# Patient Record
Sex: MALE | HISPANIC OR LATINO | Employment: UNEMPLOYED | URBAN - METROPOLITAN AREA
[De-identification: names, ages, dates, MRNs, and addresses within clinical notes are randomized per-mention and may not be internally consistent; named-entity substitution may affect disease eponyms.]

---

## 2017-01-04 ENCOUNTER — APPOINTMENT (OUTPATIENT)
Dept: RADIOLOGY | Facility: MEDICAL CENTER | Age: 67
DRG: 418 | End: 2017-01-04
Attending: EMERGENCY MEDICINE
Payer: MEDICAID

## 2017-01-04 ENCOUNTER — HOSPITAL ENCOUNTER (INPATIENT)
Facility: MEDICAL CENTER | Age: 67
LOS: 4 days | DRG: 418 | End: 2017-01-08
Attending: EMERGENCY MEDICINE | Admitting: INTERNAL MEDICINE
Payer: MEDICAID

## 2017-01-04 ENCOUNTER — RESOLUTE PROFESSIONAL BILLING HOSPITAL PROF FEE (OUTPATIENT)
Dept: HOSPITALIST | Facility: MEDICAL CENTER | Age: 67
End: 2017-01-04
Payer: MEDICAID

## 2017-01-04 DIAGNOSIS — K81.9 CHOLECYSTITIS: ICD-10-CM

## 2017-01-04 PROBLEM — R10.9 ABDOMINAL PAIN: Status: ACTIVE | Noted: 2017-01-04

## 2017-01-04 PROBLEM — E11.9 DIABETES (HCC): Status: ACTIVE | Noted: 2017-01-04

## 2017-01-04 LAB
ALBUMIN SERPL BCP-MCNC: 3.9 G/DL (ref 3.2–4.9)
ALBUMIN/GLOB SERPL: 1.6 G/DL
ALP SERPL-CCNC: 48 U/L (ref 30–99)
ALT SERPL-CCNC: 17 U/L (ref 2–50)
ANION GAP SERPL CALC-SCNC: 10 MMOL/L (ref 0–11.9)
APPEARANCE UR: CLEAR
AST SERPL-CCNC: 20 U/L (ref 12–45)
BASOPHILS # BLD AUTO: 0 % (ref 0–1.8)
BASOPHILS # BLD: 0 K/UL (ref 0–0.12)
BILIRUB SERPL-MCNC: 0.9 MG/DL (ref 0.1–1.5)
BILIRUB UR QL STRIP.AUTO: NEGATIVE
BUN SERPL-MCNC: 15 MG/DL (ref 8–22)
CALCIUM SERPL-MCNC: 9.7 MG/DL (ref 8.5–10.5)
CHLORIDE SERPL-SCNC: 101 MMOL/L (ref 96–112)
CO2 SERPL-SCNC: 26 MMOL/L (ref 20–33)
COLOR UR: YELLOW
CREAT SERPL-MCNC: 0.81 MG/DL (ref 0.5–1.4)
CULTURE IF INDICATED INDCX: NO UA CULTURE
EKG IMPRESSION: NORMAL
EOSINOPHIL # BLD AUTO: 0 K/UL (ref 0–0.51)
EOSINOPHIL NFR BLD: 0 % (ref 0–6.9)
ERYTHROCYTE [DISTWIDTH] IN BLOOD BY AUTOMATED COUNT: 40.9 FL (ref 35.9–50)
GFR SERPL CREATININE-BSD FRML MDRD: >60 ML/MIN/1.73 M 2
GLOBULIN SER CALC-MCNC: 2.5 G/DL (ref 1.9–3.5)
GLUCOSE BLD-MCNC: 116 MG/DL (ref 65–99)
GLUCOSE BLD-MCNC: 181 MG/DL (ref 65–99)
GLUCOSE SERPL-MCNC: 158 MG/DL (ref 65–99)
GLUCOSE UR STRIP.AUTO-MCNC: ABNORMAL MG/DL
HCT VFR BLD AUTO: 44.6 % (ref 42–52)
HGB BLD-MCNC: 15.6 G/DL (ref 14–18)
KETONES UR STRIP.AUTO-MCNC: ABNORMAL MG/DL
LACTATE BLD-SCNC: 3 MMOL/L (ref 0.5–2)
LEUKOCYTE ESTERASE UR QL STRIP.AUTO: NEGATIVE
LIPASE SERPL-CCNC: 28 U/L (ref 11–82)
LYMPHOCYTES # BLD AUTO: 0.51 K/UL (ref 1–4.8)
LYMPHOCYTES NFR BLD: 4.4 % (ref 22–41)
MANUAL DIFF BLD: ABNORMAL
MCH RBC QN AUTO: 30.1 PG (ref 27–33)
MCHC RBC AUTO-ENTMCNC: 35 G/DL (ref 33.7–35.3)
MCV RBC AUTO: 86.1 FL (ref 81.4–97.8)
METAMYELOCYTES NFR BLD MANUAL: 4.3 %
MICRO URNS: ABNORMAL
MONOCYTES # BLD AUTO: 0.3 K/UL (ref 0–0.85)
MONOCYTES NFR BLD AUTO: 2.6 % (ref 0–13.4)
MORPHOLOGY BLD-IMP: NORMAL
NEUTROPHILS # BLD AUTO: 10.29 K/UL (ref 1.82–7.42)
NEUTROPHILS NFR BLD: 76.5 % (ref 44–72)
NEUTS BAND NFR BLD MANUAL: 12.2 % (ref 0–10)
NITRITE UR QL STRIP.AUTO: NEGATIVE
NRBC # BLD AUTO: 0 K/UL
NRBC BLD AUTO-RTO: 0 /100 WBC
PH UR STRIP.AUTO: 6 [PH]
PLATELET # BLD AUTO: 98 K/UL (ref 164–446)
PLATELET BLD QL SMEAR: NORMAL
PMV BLD AUTO: 8.7 FL (ref 9–12.9)
POTASSIUM SERPL-SCNC: 3.5 MMOL/L (ref 3.6–5.5)
PROT SERPL-MCNC: 6.4 G/DL (ref 6–8.2)
PROT UR QL STRIP: NEGATIVE MG/DL
RBC # BLD AUTO: 5.18 M/UL (ref 4.7–6.1)
RBC BLD AUTO: NORMAL
RBC UR QL AUTO: NEGATIVE
SODIUM SERPL-SCNC: 137 MMOL/L (ref 135–145)
SP GR UR STRIP.AUTO: 1.02
TSH SERPL DL<=0.005 MIU/L-ACNC: 0.83 UIU/ML (ref 0.3–3.7)
WBC # BLD AUTO: 11.6 K/UL (ref 4.8–10.8)

## 2017-01-04 PROCEDURE — 700117 HCHG RX CONTRAST REV CODE 255: Performed by: EMERGENCY MEDICINE

## 2017-01-04 PROCEDURE — 93005 ELECTROCARDIOGRAM TRACING: CPT | Performed by: EMERGENCY MEDICINE

## 2017-01-04 PROCEDURE — 700101 HCHG RX REV CODE 250: Performed by: INTERNAL MEDICINE

## 2017-01-04 PROCEDURE — 83605 ASSAY OF LACTIC ACID: CPT

## 2017-01-04 PROCEDURE — 83690 ASSAY OF LIPASE: CPT

## 2017-01-04 PROCEDURE — 770006 HCHG ROOM/CARE - MED/SURG/GYN SEMI*

## 2017-01-04 PROCEDURE — 700105 HCHG RX REV CODE 258: Performed by: EMERGENCY MEDICINE

## 2017-01-04 PROCEDURE — 96361 HYDRATE IV INFUSION ADD-ON: CPT

## 2017-01-04 PROCEDURE — 99223 1ST HOSP IP/OBS HIGH 75: CPT | Performed by: INTERNAL MEDICINE

## 2017-01-04 PROCEDURE — A9270 NON-COVERED ITEM OR SERVICE: HCPCS | Performed by: INTERNAL MEDICINE

## 2017-01-04 PROCEDURE — 96365 THER/PROPH/DIAG IV INF INIT: CPT

## 2017-01-04 PROCEDURE — 71010 DX-CHEST-PORTABLE (1 VIEW): CPT

## 2017-01-04 PROCEDURE — 74177 CT ABD & PELVIS W/CONTRAST: CPT

## 2017-01-04 PROCEDURE — 83036 HEMOGLOBIN GLYCOSYLATED A1C: CPT

## 2017-01-04 PROCEDURE — 85007 BL SMEAR W/DIFF WBC COUNT: CPT

## 2017-01-04 PROCEDURE — 96375 TX/PRO/DX INJ NEW DRUG ADDON: CPT

## 2017-01-04 PROCEDURE — 81003 URINALYSIS AUTO W/O SCOPE: CPT

## 2017-01-04 PROCEDURE — 80053 COMPREHEN METABOLIC PANEL: CPT

## 2017-01-04 PROCEDURE — 84443 ASSAY THYROID STIM HORMONE: CPT

## 2017-01-04 PROCEDURE — 700112 HCHG RX REV CODE 229: Performed by: INTERNAL MEDICINE

## 2017-01-04 PROCEDURE — 99285 EMERGENCY DEPT VISIT HI MDM: CPT

## 2017-01-04 PROCEDURE — 85027 COMPLETE CBC AUTOMATED: CPT

## 2017-01-04 PROCEDURE — 700102 HCHG RX REV CODE 250 W/ 637 OVERRIDE(OP): Performed by: INTERNAL MEDICINE

## 2017-01-04 PROCEDURE — 700111 HCHG RX REV CODE 636 W/ 250 OVERRIDE (IP): Performed by: EMERGENCY MEDICINE

## 2017-01-04 PROCEDURE — 82962 GLUCOSE BLOOD TEST: CPT

## 2017-01-04 PROCEDURE — 94760 N-INVAS EAR/PLS OXIMETRY 1: CPT

## 2017-01-04 PROCEDURE — 700111 HCHG RX REV CODE 636 W/ 250 OVERRIDE (IP): Performed by: INTERNAL MEDICINE

## 2017-01-04 RX ORDER — PROMETHAZINE HYDROCHLORIDE 12.5 MG/1
12.5-25 SUPPOSITORY RECTAL EVERY 4 HOURS PRN
Status: DISCONTINUED | OUTPATIENT
Start: 2017-01-04 | End: 2017-01-08 | Stop reason: HOSPADM

## 2017-01-04 RX ORDER — SODIUM CHLORIDE 9 MG/ML
1000 INJECTION, SOLUTION INTRAVENOUS ONCE
Status: COMPLETED | OUTPATIENT
Start: 2017-01-04 | End: 2017-01-04

## 2017-01-04 RX ORDER — SODIUM CHLORIDE AND POTASSIUM CHLORIDE 150; 900 MG/100ML; MG/100ML
INJECTION, SOLUTION INTRAVENOUS CONTINUOUS
Status: DISCONTINUED | OUTPATIENT
Start: 2017-01-04 | End: 2017-01-06

## 2017-01-04 RX ORDER — LACTULOSE 20 G/30ML
30 SOLUTION ORAL
Status: DISCONTINUED | OUTPATIENT
Start: 2017-01-04 | End: 2017-01-08 | Stop reason: HOSPADM

## 2017-01-04 RX ORDER — MORPHINE SULFATE 4 MG/ML
4 INJECTION, SOLUTION INTRAMUSCULAR; INTRAVENOUS
Status: DISCONTINUED | OUTPATIENT
Start: 2017-01-04 | End: 2017-01-04

## 2017-01-04 RX ORDER — HYDROCODONE BITARTRATE AND ACETAMINOPHEN 10; 325 MG/1; MG/1
1 TABLET ORAL EVERY 8 HOURS PRN
Status: DISCONTINUED | OUTPATIENT
Start: 2017-01-04 | End: 2017-01-08 | Stop reason: HOSPADM

## 2017-01-04 RX ORDER — DEXTROSE MONOHYDRATE 25 G/50ML
25 INJECTION, SOLUTION INTRAVENOUS
Status: DISCONTINUED | OUTPATIENT
Start: 2017-01-04 | End: 2017-01-08 | Stop reason: HOSPADM

## 2017-01-04 RX ORDER — ENEMA 19; 7 G/133ML; G/133ML
1 ENEMA RECTAL
Status: DISCONTINUED | OUTPATIENT
Start: 2017-01-04 | End: 2017-01-08 | Stop reason: HOSPADM

## 2017-01-04 RX ORDER — INSULIN GLARGINE 100 [IU]/ML
15 INJECTION, SOLUTION SUBCUTANEOUS EVERY EVENING
Status: DISCONTINUED | OUTPATIENT
Start: 2017-01-04 | End: 2017-01-06

## 2017-01-04 RX ORDER — ONDANSETRON 2 MG/ML
4 INJECTION INTRAMUSCULAR; INTRAVENOUS ONCE
Status: COMPLETED | OUTPATIENT
Start: 2017-01-04 | End: 2017-01-04

## 2017-01-04 RX ORDER — AMOXICILLIN 250 MG
1 CAPSULE ORAL NIGHTLY
Status: DISCONTINUED | OUTPATIENT
Start: 2017-01-04 | End: 2017-01-08 | Stop reason: HOSPADM

## 2017-01-04 RX ORDER — LISINOPRIL 10 MG/1
5 TABLET ORAL DAILY
Status: DISCONTINUED | OUTPATIENT
Start: 2017-01-04 | End: 2017-01-05

## 2017-01-04 RX ORDER — ROSUVASTATIN CALCIUM 20 MG/1
40 TABLET, COATED ORAL NIGHTLY
Status: DISCONTINUED | OUTPATIENT
Start: 2017-01-04 | End: 2017-01-08 | Stop reason: HOSPADM

## 2017-01-04 RX ORDER — CIPROFLOXACIN 2 MG/ML
400 INJECTION, SOLUTION INTRAVENOUS EVERY 12 HOURS
Status: DISCONTINUED | OUTPATIENT
Start: 2017-01-04 | End: 2017-01-08 | Stop reason: HOSPADM

## 2017-01-04 RX ORDER — ONDANSETRON 4 MG/1
4 TABLET, ORALLY DISINTEGRATING ORAL EVERY 4 HOURS PRN
Status: DISCONTINUED | OUTPATIENT
Start: 2017-01-04 | End: 2017-01-08 | Stop reason: HOSPADM

## 2017-01-04 RX ORDER — AMOXICILLIN 250 MG
1 CAPSULE ORAL
Status: DISCONTINUED | OUTPATIENT
Start: 2017-01-04 | End: 2017-01-08 | Stop reason: HOSPADM

## 2017-01-04 RX ORDER — ONDANSETRON 2 MG/ML
4 INJECTION INTRAMUSCULAR; INTRAVENOUS EVERY 4 HOURS PRN
Status: DISCONTINUED | OUTPATIENT
Start: 2017-01-04 | End: 2017-01-08 | Stop reason: HOSPADM

## 2017-01-04 RX ORDER — PROMETHAZINE HYDROCHLORIDE 25 MG/1
12.5-25 TABLET ORAL EVERY 4 HOURS PRN
Status: DISCONTINUED | OUTPATIENT
Start: 2017-01-04 | End: 2017-01-08 | Stop reason: HOSPADM

## 2017-01-04 RX ORDER — ACETAMINOPHEN 325 MG/1
650 TABLET ORAL EVERY 6 HOURS PRN
Status: DISCONTINUED | OUTPATIENT
Start: 2017-01-04 | End: 2017-01-05

## 2017-01-04 RX ORDER — DOCUSATE SODIUM 100 MG/1
100 CAPSULE, LIQUID FILLED ORAL EVERY MORNING
Status: DISCONTINUED | OUTPATIENT
Start: 2017-01-04 | End: 2017-01-08 | Stop reason: HOSPADM

## 2017-01-04 RX ORDER — FAMOTIDINE 20 MG/1
20 TABLET, FILM COATED ORAL 2 TIMES DAILY
Status: DISCONTINUED | OUTPATIENT
Start: 2017-01-04 | End: 2017-01-08 | Stop reason: HOSPADM

## 2017-01-04 RX ORDER — LABETALOL HYDROCHLORIDE 5 MG/ML
10 INJECTION, SOLUTION INTRAVENOUS EVERY 4 HOURS PRN
Status: DISCONTINUED | OUTPATIENT
Start: 2017-01-04 | End: 2017-01-08 | Stop reason: HOSPADM

## 2017-01-04 RX ORDER — BISACODYL 10 MG
10 SUPPOSITORY, RECTAL RECTAL
Status: DISCONTINUED | OUTPATIENT
Start: 2017-01-04 | End: 2017-01-08 | Stop reason: HOSPADM

## 2017-01-04 RX ORDER — RANITIDINE 300 MG/1
300 TABLET ORAL DAILY
Status: DISCONTINUED | OUTPATIENT
Start: 2017-01-04 | End: 2017-01-04

## 2017-01-04 RX ADMIN — IOHEXOL 100 ML: 350 INJECTION, SOLUTION INTRAVENOUS at 06:15

## 2017-01-04 RX ADMIN — LISINOPRIL 5 MG: 10 TABLET ORAL at 11:27

## 2017-01-04 RX ADMIN — PIPERACILLIN AND TAZOBACTAM 4.5 G: 4; .5 INJECTION, POWDER, LYOPHILIZED, FOR SOLUTION INTRAVENOUS; PARENTERAL at 07:12

## 2017-01-04 RX ADMIN — ACETAMINOPHEN 650 MG: 325 TABLET, FILM COATED ORAL at 19:00

## 2017-01-04 RX ADMIN — FAMOTIDINE 20 MG: 20 TABLET, FILM COATED ORAL at 21:07

## 2017-01-04 RX ADMIN — HYDROCODONE BITARTRATE AND ACETAMINOPHEN 0.5 TABLET: 10; 325 TABLET ORAL at 11:29

## 2017-01-04 RX ADMIN — INSULIN LISPRO 3 UNITS: 100 INJECTION, SOLUTION INTRAVENOUS; SUBCUTANEOUS at 16:43

## 2017-01-04 RX ADMIN — CIPROFLOXACIN 400 MG: 2 INJECTION, SOLUTION INTRAVENOUS at 11:19

## 2017-01-04 RX ADMIN — ONDANSETRON 4 MG: 2 INJECTION, SOLUTION INTRAMUSCULAR; INTRAVENOUS at 04:35

## 2017-01-04 RX ADMIN — DOCUSATE SODIUM 100 MG: 100 CAPSULE ORAL at 11:30

## 2017-01-04 RX ADMIN — CIPROFLOXACIN 400 MG: 2 INJECTION, SOLUTION INTRAVENOUS at 21:00

## 2017-01-04 RX ADMIN — METRONIDAZOLE 500 MG: 500 INJECTION, SOLUTION INTRAVENOUS at 13:05

## 2017-01-04 RX ADMIN — METRONIDAZOLE 500 MG: 500 INJECTION, SOLUTION INTRAVENOUS at 21:05

## 2017-01-04 RX ADMIN — POTASSIUM CHLORIDE AND SODIUM CHLORIDE: 900; 150 INJECTION, SOLUTION INTRAVENOUS at 11:23

## 2017-01-04 RX ADMIN — MORPHINE SULFATE 4 MG: 4 INJECTION INTRAVENOUS at 07:53

## 2017-01-04 RX ADMIN — SODIUM CHLORIDE 1000 ML: 9 INJECTION, SOLUTION INTRAVENOUS at 06:22

## 2017-01-04 RX ADMIN — Medication 1 TABLET: at 21:07

## 2017-01-04 RX ADMIN — ROSUVASTATIN CALCIUM 40 MG: 20 TABLET ORAL at 21:07

## 2017-01-04 RX ADMIN — SODIUM CHLORIDE 1000 ML: 9 INJECTION, SOLUTION INTRAVENOUS at 04:08

## 2017-01-04 RX ADMIN — ENOXAPARIN SODIUM 40 MG: 100 INJECTION SUBCUTANEOUS at 13:05

## 2017-01-04 RX ADMIN — FAMOTIDINE 20 MG: 10 INJECTION, SOLUTION INTRAVENOUS at 11:11

## 2017-01-04 ASSESSMENT — PAIN SCALES - GENERAL
PAINLEVEL_OUTOF10: ASSUMED PAIN PRESENT
PAINLEVEL_OUTOF10: 5
PAINLEVEL_OUTOF10: 4
PAINLEVEL_OUTOF10: ASSUMED PAIN PRESENT

## 2017-01-04 ASSESSMENT — LIFESTYLE VARIABLES
EVER_SMOKED: NEVER
EVER_SMOKED: NEVER
ALCOHOL_USE: NO

## 2017-01-04 ASSESSMENT — COPD QUESTIONNAIRES
COPD SCREENING SCORE: 2
HAVE YOU SMOKED AT LEAST 100 CIGARETTES IN YOUR ENTIRE LIFE: NO/DON'T KNOW
DURING THE PAST 4 WEEKS HOW MUCH DID YOU FEEL SHORT OF BREATH: NONE/LITTLE OF THE TIME
DO YOU EVER COUGH UP ANY MUCUS OR PHLEGM?: NO/ONLY WITH OCCASIONAL COLDS OR INFECTIONS

## 2017-01-04 NOTE — IP AVS SNAPSHOT
" After Visit Summary                                                                                                                  Name:Yonis De La Cruz  Medical Record Number:[id number]  CSN: [id number]    YOB: 1950   Age: 66 y.o.  Sex: male  HT:1.702 m (5' 7.01\") WT: 72 kg (158 lb 11.7 oz)          Admit Date: 1/4/2017     Discharge Date:   Today's Date: 1/8/2017  Attending Doctor:  RENE Souza*                  Allergies:  Aspirin and Bloodless            Discharge Instructions       Do not lift or pull anything weighing greater than 15 pounds for the next four weeks.  You may remove the bandages when you get home.  You may shower, but may not soak in tubs, hot tubs, or swim.  You may take an over-the-counter laxative for constipation as needed.  Call the surgeon's office for any signs of infection such as drainage for the incisions, fevers, chills, or redness. Notify office if any incisions open up and for pain not controlled with medication. For any emergency, attend your local ED.  Take medications as prescribed.    Discharge Instructions    Discharged to home by car with relative. Discharged via wheelchair, hospital escort: Yes.  Special equipment needed: Not Applicable    Be sure to schedule a follow-up appointment with your primary care doctor or any specialists as instructed.     Discharge Plan:   Diet Plan: Discussed  Activity Level: Discussed  Confirmed Follow up Appointment: Patient to Call and Schedule Appointment  Confirmed Symptoms Management: Discussed  Medication Reconciliation Updated: Yes  Influenza Vaccine Indication: Not indicated: Previously immunized this influenza season and > 8 years of age  Influenza Vaccine Given - only chart on this line when given: Influenza Vaccine Given (See MAR)    I understand that a diet low in cholesterol, fat, and sodium is recommended for good health. Unless I have been given specific instructions below for another diet, I accept this " instruction as my diet prescription.   Other diet: Diabetic, low fiber.    Special Instructions: None    · Is patient discharged on Warfarin / Coumadin?   No     · Is patient Post Blood Transfusion?  No    Depression / Suicide Risk    As you are discharged from this [facility] facility, it is important to learn how to keep safe from harming yourself.    Recognize the warning signs:  · Abrupt changes in personality, positive or negative- including increase in energy   · Giving away possessions  · Change in eating patterns- significant weight changes-  positive or negative  · Change in sleeping patterns- unable to sleep or sleeping all the time   · Unwillingness or inability to communicate  · Depression  · Unusual sadness, discouragement and loneliness  · Talk of wanting to die  · Neglect of personal appearance   · Rebelliousness- reckless behavior  · Withdrawal from people/activities they love  · Confusion- inability to concentrate     If you or a loved one observes any of these behaviors or has concerns about self-harm, here's what you can do:  · Talk about it- your feelings and reasons for harming yourself  · Remove any means that you might use to hurt yourself (examples: pills, rope, extension cords, firearm)  · Get professional help from the community (Mental Health, Substance Abuse, psychological counseling)  · Do not be alone:Call your Safe Contact- someone whom you trust who will be there for you.  · Call your local CRISIS HOTLINE 234-8088 or 280-387-9203  · Call your local Children's Mobile Crisis Response Team [place] (885) 882-6099 or www.Amitive  · Call the toll free National Suicide Prevention Hotlines   · National Suicide Prevention Lifeline 530-021-LDKC (1304)  · National Hope Line Network 800-SUICIDE (314-3850)        Follow-up Information     1. Follow up with Dustin Ferreira M.D.. Schedule an appointment as soon as possible for a visit in 10 days.    Specialties:  Surgery,  Radiology    Contact information    [address]  [address]  [place] [id number] [phone]           Discharge Medication Instructions:    Below are the medications your physician expects you to take upon discharge:    Review all your home medications and newly ordered medications with your doctor and/or pharmacist. Follow medication instructions as directed by your doctor and/or pharmacist.    Please keep your medication list with you and share with your physician.               Medication List      START taking these medications        Instructions    acetaminophen 325 MG Tabs   Last time this was given:  650 mg on 1/8/2017  8:28 AM   Commonly known as:  TYLENOL   Next Dose Due:  Anytime as needed.    Take 2 Tabs by mouth every 6 hours as needed for Mild Pain.   Dose:  650 mg       ciprofloxacin 500 MG Tabs   Commonly known as:  CIPRO   Next Dose Due:  1/8 PM    Take 1 Tab by mouth 2 times a day.   Dose:  500 mg       magnesium hydroxide 400 MG/5ML Susp   Last time this was given:  30 mL on 1/7/2017  7:59 AM   Commonly known as:  MILK OF MAGNESIA   Next Dose Due:  As needed    Take 30 mL by mouth 4 times a day as needed (constipation).   Dose:  30 mL       metronidazole 500 MG Tabs   Commonly known as:  FLAGYL   Next Dose Due:  1/8 Afternoon    Take 1 Tab by mouth every 8 hours.   Dose:  500 mg       polyethylene glycol/lytes Pack   Last time this was given:  1 Packet on 1/8/2017  8:28 AM   Commonly known as:  MIRALAX   Next Dose Due:  1/9    Take  by mouth every day.         CHANGE how you take these medications        Instructions    hydrocodone/acetaminophen  MG Tabs   What changed:  reasons to take this   Last time this was given:  1 Tab on 1/7/2017 11:56 AM   Commonly known as:  NORCO   Next Dose Due:  Anytime as needed    Take 1 Tab by mouth every 8 hours as needed.   Dose:  1 Tab         CONTINUE taking these medications        Instructions    CALCIUM PO   Next Dose Due:  1/8 PM    Take 1 Tab by  mouth 3 times a day.   Dose:  1 Tab       CRESTOR 40 MG tablet   Last time this was given:  40 mg on 1/7/2017  9:10 PM   Generic drug:  rosuvastatin   Next Dose Due:  1/8 PM    TAKE ONE TABLET BY MOUTH ONCE DAILY       LANTUS 100 UNIT/ML Soln   Last time this was given:  18 Units on 1/7/2017  9:14 PM   Generic drug:  insulin glargine   Next Dose Due:  1/8 PM    INJECT 20 UNITS SUBCUTANEOUSLY AS INSTRUCTED TWICE DAILY       lisinopril 5 MG Tabs   Last time this was given:  5 mg on 1/4/2017 11:27 AM   Commonly known as:  PRINIVIL   Next Dose Due:  1/9 PM    Take 1 Tab by mouth every day.   Dose:  5 mg       metformin 1000 MG tablet   Commonly known as:  GLUCOPHAGE   Next Dose Due:  1/8 with dinner    TAKE ONE TABLET BY MOUTH TWICE DAILY WITH FOOD       ranitidine 300 MG tablet   Commonly known as:  ZANTAC   Next Dose Due:  1/9    Take 1 Tab by mouth every day.   Dose:  300 mg               Instructions           Diet / Nutrition:    Follow any diet instructions given to you by your doctor or the dietician, including how much salt (sodium) you are allowed each day.    If you are overweight, talk to your doctor about a weight reduction plan.    Activity:    Remain physically active following your doctor's instructions about exercise and activity.    Rest often.     Any time you become even a little tired or short of breath, SIT DOWN and rest.    Worsening Symptoms:    Report any of the following signs and symptoms to the doctor's office immediately:    *Pain of jaw, arm, or neck  *Chest pain not relieved by medication                               *Dizziness or loss of consciousness  *Difficulty breathing even when at rest   *More tired than usual                                       *Bleeding drainage or swelling of surgical site  *Swelling of feet, ankles, legs or stomach                 *Fever (>100ºF)  *Pink or blood tinged sputum  *Weight gain (3lbs/day or 5lbs /week)           *Shock from internal defibrillator  (if applicable)  *Palpitations or irregular heartbeats                *Cool and/or numb extremities    Stroke Awareness    Common Risk Factors for Stroke include:    Age  Atrial Fibrillation  Carotid Artery Stenosis  Diabetes Mellitus  Excessive alcohol consumption  High blood pressure  Overweight   Physical inactivity  Smoking    Warning signs and symptoms of a stroke include:    *Sudden numbness or weakness of the face, arm or leg (especially on one side of the body).  *Sudden confusion, trouble speaking or understanding.  *Sudden trouble seeing in one or both eyes.  *Sudden trouble walking, dizziness, loss of balance or coordination.Sudden severe headache with no known cause.    It is very important to get treatment quickly when a stroke occurs. If you experience any of the above warning signs, call 911 immediately.                   Disclaimer         Quit Smoking / Tobacco Use:    I understand the use of any tobacco products increases my chance of suffering from future heart disease or stroke and could cause other illnesses which may shorten my life. Quitting the use of tobacco products is the single most important thing I can do to improve my health. For further information on smoking / tobacco cessation call a Toll Free Quit Line at 1-254.347.2414 (*National Cancer [place]) or 1-966.111.7843 (American Lung Association) or you can access the web based program at www.lungusa.org.    [place] Tobacco Users Help Line:  (603) 774-1954       Toll Free: 1-919.894.7230  Quit Tobacco Program [facility] (959)426-4812    Crisis Hotline:    [place] Crisis Hotline:  8-631-ZHPFVSC or 1-194.571.7972    [place] Crisis Hotline:    1-765.494.5543 or 470-144-5429    Discharge Survey:   Thank you for choosing [facility]. We hope we did everything we could to make your hospital stay a pleasant one. You may be receiving a phone survey and we would appreciate your time and participation in answering the  questions. Your input is very valuable to us in our efforts to improve our service to our patients and their families.        My signature on this form indicates that:    1. I have reviewed and understand the above information.  2. My questions regarding this information have been answered to my satisfaction.  3. I have formulated a plan with my discharge nurse to obtain my prescribed medications for home.                  Disclaimer         __________________________________                     __________       ________                       Patient Signature                                                 Date                    Time

## 2017-01-04 NOTE — ED NOTES
Assumed care of pt. Pt updated on POC, waiting for HIDA scan and admit orders. Pt denies needs. Family at bedside.

## 2017-01-04 NOTE — CARE PLAN
Problem: Communication  Goal: The ability to communicate needs accurately and effectively will improve  Outcome: PROGRESSING AS EXPECTED  Pt has family to translate at bedside. Pt has a lot of concerns about the bloodless program     Problem: Pain Management  Goal: Pain level will decrease to patient’s comfort goal  Outcome: PROGRESSING AS EXPECTED  Opioid can not be used at thi time due to NM biliary scan order. Pain is being managed with 5mg norco.

## 2017-01-04 NOTE — ED NOTES
"Per Nuc med, pt cannot have narcotics for \"6 hours,\" prior to HIDA scan. So pt will not have a HIDA scan until tomorrow per NUC med.   "

## 2017-01-04 NOTE — PROGRESS NOTES
Assumed care at 1000. Pt in bed T313 bed 2. A/O x4. VSS. Responds appropriately. Denies SOB or chest pain. Pain is 6/10 awaiting pharmacy to verify medications. Assessment complete. Pt is up self and steady. Family at bedside. NM biliary scan scheduled for tomorrow morning. Pt is currently NPO MD notified awaiting call back to change pt tp NPO at 0000. Discussed POC, pt verbalizes understanding. Explained importance of calling before getting OOB. Call light and belongings within reach. Bed alarm is off. Bed in the lowest position. Treaded socks in place. Hourly rounding in progress.

## 2017-01-04 NOTE — ED PROVIDER NOTES
ED Provider Note    CHIEF COMPLAINT  Chief Complaint   Patient presents with   • Abdominal Pain   • N/V         HPI  Yonis De La Cruz is a 66 y.o. male who presents with abdominal pain. Started yesterday. Characterized as a bloating sensation. Constant seems to be getting worse. His last bowel movement was 2 days ago and he feels constipated. He thinks the pain may be a little bit more on the right side than on the left. Difficult to tell. He has not had dysuria or hematuria. He has felt nauseated without vomiting. He has not had a fever or chills. Patient has had intermittent abdominal pain for which she's been seen by his primary provider. He was told that he has gas. He's been taking Zantac as well for these symptoms. He denies chest pain or shortness of breath. No headache lightheadedness dizziness syncope.    REVIEW OF SYSTEMS  As per HPI  All other systems are negative.     PAST MEDICAL HISTORY  Past Medical History   Diagnosis Date   • Type II or unspecified type diabetes mellitus without mention of complication, not stated as uncontrolled 1997     injecting Lantus twice a day   • Hyperlipidemia 2005       FAMILY HISTORY  Family History   Problem Relation Age of Onset   • Diabetes Mother    • Cancer Neg Hx    • Heart Disease Neg Hx    • Stroke Neg Hx        SOCIAL HISTORY  Social History   Substance Use Topics   • Smoking status: Never Smoker    • Smokeless tobacco: Never Used   • Alcohol Use: No      Comment: quit age 27       SURGICAL HISTORY  Past Surgical History   Procedure Laterality Date   • General lung surgery  age 27?     assoc with pnuemonia       CURRENT MEDICATIONS  Home Medications     Reviewed by Sumi Otero (Pharmacy Tech) on 01/04/17 at 0806  Med List Status: Complete    Medication Last Dose Status    CALCIUM PO 1/3/2017 Active    CRESTOR 40 MG tablet 1/3/2017 Active    hydrocodone/acetaminophen (NORCO)  MG Tab 1/3/2017 Active    LANTUS 100 UNIT/ML Solution 1/3/2017 Active     "lisinopril (PRINIVIL) 5 MG Tab 1/3/2017 Active    metformin (GLUCOPHAGE) 1000 MG tablet 1/3/2017 Active    ranitidine (ZANTAC) 300 MG tablet 1/3/2017 Active                ALLERGIES  Allergies   Allergen Reactions   • Aspirin      abd pain       PHYSICAL EXAM  VITAL SIGNS: /62 mmHg  Pulse 84  Temp(Src) 37.2 °C (99 °F)  Resp 20  Ht 1.702 m (5' 7\")  Wt 72.6 kg (160 lb 0.9 oz)  BMI 25.06 kg/m2  SpO2 96%  Constitutional: Awake and alert  HENT: Normal inspection  Eyes: Sclera white, pterygium  Neck: Normal range of motion  Cardiovascular: Normal heart rate, Normal rhythm  Thorax & Lungs: Normal breath sounds, No respiratory distress, No wheezing, No chest tenderness.   Abdomen: Tender to palpation diffusely and more on the right abdomen. Both right upper and right lower quadrants involved. No rebound or peritonitis. Bowel sounds are normal. No mass or distention.  Skin: No rash.   Back: No tenderness, No CVA tenderness.   Extremities: Intact, symmetric distal pulses, no edema.  Neurologic: Grossly normal    RADIOLOGY/PROCEDURES  DX-CHEST-PORTABLE (1 VIEW)   Final Result         1. Low lung volumes with hypoventilatory change and bibasilar atelectasis.      CT-ABDOMEN-PELVIS WITH   Final Result         1. Several large gallstones in the gallbladder neck with distended gallbladder and mild gallbladder wall thickening. These findings are concerning for acute cholecystitis. HIDA scan could be helpful for confirmation if clinically ambiguous.      2. Normal appendix. Moderate amount of stool throughout the colon.      3. There is a tubular object in the urethra. Correlate clinically.      NM-BILIARY (HIDA) SCAN WITH CCK    (Results Pending)        Imaging is interpreted by radiologist      Labs:   Results for orders placed or performed during the hospital encounter of 01/04/17   CBC WITH DIFFERENTIAL   Result Value Ref Range    WBC 11.6 (H) 4.8 - 10.8 K/uL    RBC 5.18 4.70 - 6.10 M/uL    Hemoglobin 15.6 14.0 - " 18.0 g/dL    Hematocrit 44.6 42.0 - 52.0 %    MCV 86.1 81.4 - 97.8 fL    MCH 30.1 27.0 - 33.0 pg    MCHC 35.0 33.7 - 35.3 g/dL    RDW 40.9 35.9 - 50.0 fL    Platelet Count 98 (L) 164 - 446 K/uL    MPV 8.7 (L) 9.0 - 12.9 fL    Nucleated RBC 0.00 /100 WBC    NRBC (Absolute) 0.00 K/uL    Neutrophils-Polys 76.50 (H) 44.00 - 72.00 %    Lymphocytes 4.40 (L) 22.00 - 41.00 %    Monocytes 2.60 0.00 - 13.40 %    Eosinophils 0.00 0.00 - 6.90 %    Basophils 0.00 0.00 - 1.80 %    Neutrophils (Absolute) 10.29 (H) 1.82 - 7.42 K/uL    Lymphs (Absolute) 0.51 (L) 1.00 - 4.80 K/uL    Monos (Absolute) 0.30 0.00 - 0.85 K/uL    Eos (Absolute) 0.00 0.00 - 0.51 K/uL    Baso (Absolute) 0.00 0.00 - 0.12 K/uL   COMP METABOLIC PANEL   Result Value Ref Range    Sodium 137 135 - 145 mmol/L    Potassium 3.5 (L) 3.6 - 5.5 mmol/L    Chloride 101 96 - 112 mmol/L    Co2 26 20 - 33 mmol/L    Anion Gap 10.0 0.0 - 11.9    Glucose 158 (H) 65 - 99 mg/dL    Bun 15 8 - 22 mg/dL    Creatinine 0.81 0.50 - 1.40 mg/dL    Calcium 9.7 8.5 - 10.5 mg/dL    AST(SGOT) 20 12 - 45 U/L    ALT(SGPT) 17 2 - 50 U/L    Alkaline Phosphatase 48 30 - 99 U/L    Total Bilirubin 0.9 0.1 - 1.5 mg/dL    Albumin 3.9 3.2 - 4.9 g/dL    Total Protein 6.4 6.0 - 8.2 g/dL    Globulin 2.5 1.9 - 3.5 g/dL    A-G Ratio 1.6 g/dL   LIPASE   Result Value Ref Range    Lipase 28 11 - 82 U/L   LACTIC ACID   Result Value Ref Range    Lactic Acid 3.0 (H) 0.5 - 2.0 mmol/L   URINALYSIS CULTURE, IF INDICATED   Result Value Ref Range    Color Yellow     Character Clear     Specific Gravity 1.016 <1.035    Ph 6.0 5.0-8.0    Glucose Trace (A) Negative mg/dL    Ketones Trace (A) Negative mg/dL    Protein Negative Negative mg/dL    Bilirubin Negative Negative    Nitrite Negative Negative    Leukocyte Esterase Negative Negative    Occult Blood Negative Negative    Micro Urine Req see below     Culture Indicated No UA Culture   ESTIMATED GFR   Result Value Ref Range    GFR If  >60 >60  mL/min/1.73 m 2    GFR If Non African American >60 >60 mL/min/1.73 m 2   DIFFERENTIAL MANUAL   Result Value Ref Range    Bands-Stabs 12.20 (H) 0.00 - 10.00 %    Metamyelocytes 4.30 %    Manual Diff Status PERFORMED    PERIPHERAL SMEAR REVIEW   Result Value Ref Range    Peripheral Smear Review see below    PLATELET ESTIMATE   Result Value Ref Range    Plt Estimation Decreased    MORPHOLOGY   Result Value Ref Range    RBC Morphology Normal    EKG (ER)   Result Value Ref Range    Report       [facility] Emergency Dept.    Test Date:  2017  Pt Name:    ERICKA JONES                 Department: ER  MRN:        [id number]                      Room:       RD 12  Gender:     M                            Technician: [id number]  :        1950                   Requested By:JOSE GARLAND  Order #:    [id number]                    Reading MD: JOSE GARLAND MD    Measurements  Intervals                                Axis  Rate:       79                           P:          49  NM:         160                          QRS:        53  QRSD:       88                           T:          5  QT:         348  QTc:        399    Interpretive Statements  SINUS RHYTHM  No previous ECG available for comparison    Electronically Signed On 2017 8:17:01 PST by JOSE GARLAND MD             COURSE & MEDICAL DECISION MAKING  Patient presents with abdominal pain. Difficult to localize. Seemed more tender on the right side of the abdomen and more tender in the lower abdomen on his initial assessment. He felt nauseous and had been vomiting. An IV was established. He was medicated with Zofran. He was hydrated with normal saline. Laboratory data was obtained. He has mild leukocytosis with significant bandemia. Proceeded with CT scan of the abdomen and pelvis with oral and IV contrast. He has abnormal findings concerning for cholecystitis. He was given Zosyn intravenously. He is reassessed and did have  tenderness in his right upper quadrant. I consulted Dr. Hou who is evaluated the patient. Dr. Hou requested HIDA scan and this is been ordered. Patient will be admitted to the medicine team for medical management pending definitive diagnosis. Dr. Hou will follow. I consulted Dr. Cottrell for admission.    FINAL IMPRESSION  1. Abdominal pain, probable cholecystitis        This dictation was created using voice recognition software. The accuracy of the dictation is limited to the abilities of the software.  The nursing notes were reviewed and certain aspects of this information were incorporated into this note.    Electronically signed by: Ronald Pickens, 1/4/2017 8:15 AM

## 2017-01-04 NOTE — H&P
CONSULTANT SURGEON:  Dr. Ferreira.    PRIMARY CARE PHYSICIAN:  Ronald Chaves MD.    CHIEF COMPLAINT ON ADMISSION:  Abdominal pain.    HISTORY OF PRESENT ILLNESS:  Patient is a pleasant 66-year-old male who   presents with complaints of abdominal pain x2 days.  Patient reports the pain   as a bloating sensation.  Patient does report associated constipation.    Right-sided pain is greater than left.  Patient does report prior history of   similar abdominal pain in which he has an underlying history of abdominal   discomfort; however, was unable to verbalize what the underlying history is.    Patient does have a history of diabetes, insulin-dependent.    On my evaluation, the patient does continue to have mild right-sided abdominal   pain, otherwise denies any current nausea, vomiting, or dysuria.  No fever or   chills.    REVIEW OF SYSTEMS:  As per HPI.  All other systems are reviewed and negative.    PAST MEDICAL HISTORY:  Includes type 2 diabetes mellitus, insulin-dependent,   hypertension, dyslipidemia.    PAST SURGICAL HISTORY:  Right-sided chest surgery for pneumonia.    ALLERGIES:  ASPIRIN.    SOCIAL HISTORY:  Patient lives with family, is retired.  Denies any current   tobacco, alcohol, or drug use, is functional with ADLs and IADLs.    CODE STATUS:  Full code.    FAMILY HISTORY:  Reviewed, is noncontributory to this presentation.    CURRENT MEDICATIONS:  Include:  1.  Calcium 1 tab p.o. 3 times a day.  2.  Crestor 40 mg p.o. daily.  3.  Norco 10/325 one tab q. 8 hours p.r.n. pain.  4.  Lantus 20 units subQ b.i.d.  5.  Lisinopril 5 mg p.o. daily.  6.  Metformin 1 g p.o. b.i.d.  7.  Zantac 300 mg p.o. daily.    PHYSICAL EXAMINATION:  VITAL SIGNS:  On admission, temperature is 37.2, pulse 77, respirations 16,   satting 97% on room air, blood pressure is 104/45.  GENERAL:  Patient is alert and oriented x4 in mild painful distress.  HEENT:  Normocephalic, atraumatic.  Mucous membranes are moist.   Extraocular   muscles intact.  NECK:  No JVD.  No thyromegaly.  CARDIOVASCULAR:  S1, S2 is regular.  No murmurs noted.  RESPIRATORY:  Lungs clear to auscultation bilaterally.  No crackles, wheezes   or rales.  CHEST WALL:  Nontender to palpation.  BACK:  No flank tenderness to palpation.  ABDOMEN:  Bowel sounds are positive, soft.  There is tenderness to palpation   mainly in the right upper and lower quadrant.  No guarding noted.  EXTREMITIES:  No lower extremity edema.  Distal pulses palpable bilaterally.    No calf tenderness to palpation.  SKIN:  No ecchymosis or purpura.  PSYCHIATRIC:  Does not appear anxious or depressed.  NEUROLOGIC:  Cranial nerves are grossly intact.  Moving all extremities   spontaneously.  Muscle strength was 5/5 bilateral upper extremity and lower   extremity.    LABORATORY DATA:  On admission, white count 11.6, hemoglobin 15.6, MCV of 86%,   platelet count of 98, segs are 76%, bands are 12%.  Sodium 137, potassium   3.5, CO2 of 26, anion gap 10, glucose 158, BUN 15, creatinine 0.81.  LFTs are   within normal limits.  Lipase of 28.  Lactic acid 3.  Urinalysis is negative   for leukocyte esterase or nitrites.    IMAGING:  CT abdomen and pelvis reveal several large gallstones in the   gallbladder neck with distended gallbladder and mild gallbladder wall   thickening with findings concerning for acute cholecystitis.  Normal appendix,   moderate amount of stool throughout the colon.  There is tubular objection in   the urethra.  Chest x-ray shows low lung volumes with bibasilar atelectasis.    EKG, revealed sinus rhythm.    ASSESSMENT AND PLAN:  The patient is a pleasant 66-year-old male with   abdominal pain.  1.  Abdominal pain, rule out acute cholecystitis.  CT is positive for acute   gallstones with questionable cholecystitis.  The patient will be admitted to   the surgical floor.  We will continue IV fluid hydration, IV antibiotics,   Cipro and Flagyl.  Surgery has been consulted,   Jhon.  We will keep   patient n.p.o. for now.  We will continue pain control.  We will follow up a   HIDA scan.  2.  Leukocytosis secondary to above.  3.  Constipation.  We will place the patient on bowel protocol.  4.  Mild hypokalemia.  We will replete.  5.  History of poorly controlled type 2 diabetes mellitus, insulin-dependent.    We will decrease dose of Lantus.  We will continue sliding scale insulin.  We   will check an A1c.  Patient is now n.p.o.  We will adjust as needed.  6.  Hypertension.  7.  Dyslipidemia.  8.  Code status:  Full code.  9.  Gastrointestinal and deep venous thrombosis prophylaxis.  Patient will be   placed on Pepcid as well as Lovenox.    Patient will be admitted to the surgical inpatient unit, will continue IV   antibiotics.  We will follow up HIDA scan.  We anticipate greater than 2   midnight stay.  We will follow up with surgery for further recommendations.    Total admission time is 45 minutes.       ____________________________________     NEEMA VILLALPANDO DO    EN / NTS    DD:  01/04/2017 09:56:25  DT:  01/04/2017 10:31:44    D#:  [id number]  Job#:  [id number]

## 2017-01-04 NOTE — IP AVS SNAPSHOT
[employer] Access Code: Activation code not generated  Current [employer] Status: Patient Declined    Your email address is not on file at [facility].  Email Addresses are required for you to sign up for [employer], please contact [phone] to verify your personal information and to provide your email address prior to attempting to register for [employer].    Yonis De La Cruz  [address] [place] [address]    Tot[employer]  A secure, online tool to manage your health information     [facility]’s Totus Power® is a secure, online tool that connects you to your personalized health information from the privacy of your home -- day or night - making it very easy for you to manage your healthcare. Once the activation process is completed, you can even access your medical information using the [employer] Power purnima, which is available for free in the Apple Purnima store or Google Play store.     To learn more about Totus Power, visit [email or web address]    There are two levels of access available (as shown below):   My Chart Features  [facility] Primary Care Doctor [facility]  Specialists [facility]  Urgent  Care Non-[facility] Primary Care Doctor   Email your healthcare team securely and privately 24/7 X X X    Manage appointments: schedule your next appointment; view details of past/upcoming appointments X      Request prescription refills. X      View recent personal medical records, including lab and immunizations X X X X   View health record, including health history, allergies, medications X X X X   Read reports about your outpatient visits, procedures, consult and ER notes X X X X   See your discharge summary, which is a recap of your hospital and/or ER visit that includes your diagnosis, lab results, and care plan X X  X     How to register for [employer]:  Once your e-mail address has been verified, follow the following steps to sign up for [employer].     1. Go to  [email or web address]  2. Click on the Sign Up Now box, which takes you to the New Member Sign  Up page. You will need to provide the following information:  a. Enter your [employer] Access Code exactly as it appears at the top of this page. (You will not need to use this code after you’ve completed the sign-up process. If you do not sign up before the expiration date, you must request a new code.)   b. Enter your date of birth.   c. Enter your home email address.   d. Click Submit, and follow the next screen’s instructions.  3. Create a Exact[employer]at ID. This will be your Ex[employer]at login ID and cannot be changed, so think of one that is secure and easy to remember.  4. Create a [employer] password. You can change your password at any time.  5. Enter your Password Reset Question and Answer. This can be used at a later time if you forget your password.   6. Enter your e-mail address. This allows you to receive e-mail notifications when new information is available in [employer].  7. Click Sign Up. You can now view your health information.    For assistance activating your [employer] account, call [phone]

## 2017-01-04 NOTE — ED NOTES
"Chief Complaint   Patient presents with   • Abdominal Pain   • N/V     Patient ambulatory to triage. States that he has been having abdominal pain and n/v x 20 hours. He says that his pcp told him that he has \"intestinal gas\", was given ranitidine. Patient reports last BM two days ago. /45 mmHg  Pulse 84  Temp(Src) 36.1 °C (97 °F)  Resp 16  Ht 1.702 m (5' 7\")  Wt 72.6 kg (160 lb 0.9 oz)  BMI 25.06 kg/m2  SpO2 96%    "

## 2017-01-04 NOTE — IP AVS SNAPSHOT
" <p align=\"LEFT\"><IMG SRC=\"[email or web address]\" alt=\"Image\" WIDTH=\"50%\" HEIGHT=\"200\" BORDER=\"\"></p>                   Name:Yonis De La Cruz  Medical Record Number:[id number]  CSN: [id number]    YOB: 1950   Age: 66 y.o.  Sex: male  HT:1.702 m (5' 7.01\") WT: 72 kg (158 lb 11.7 oz)          Admit Date: 1/4/2017     Discharge Date:   Today's Date: 1/8/2017  Attending Doctor:  RENE Souza*                  Allergies:  Aspirin and Bloodless          Follow-up Information     1. Follow up with Dustin Ferreira M.D.. Schedule an appointment as soon as possible for a visit in 10 days.    Specialties:  Surgery, Radiology    Contact information    [address]  [address]  [facility] [address] [phone]           Medication List      Take these Medications        Instructions    acetaminophen 325 MG Tabs   Commonly known as:  TYLENOL    Take 2 Tabs by mouth every 6 hours as needed for Mild Pain.   Dose:  650 mg       CALCIUM PO    Take 1 Tab by mouth 3 times a day.   Dose:  1 Tab       ciprofloxacin 500 MG Tabs   Commonly known as:  CIPRO    Take 1 Tab by mouth 2 times a day.   Dose:  500 mg       CRESTOR 40 MG tablet   Generic drug:  rosuvastatin    TAKE ONE TABLET BY MOUTH ONCE DAILY       hydrocodone/acetaminophen  MG Tabs   What changed:  reasons to take this   Commonly known as:  NORCO    Take 1 Tab by mouth every 8 hours as needed.   Dose:  1 Tab       LANTUS 100 UNIT/ML Soln   Generic drug:  insulin glargine    INJECT 20 UNITS SUBCUTANEOUSLY AS INSTRUCTED TWICE DAILY       lisinopril 5 MG Tabs   Commonly known as:  PRINIVIL    Take 1 Tab by mouth every day.   Dose:  5 mg       magnesium hydroxide 400 MG/5ML Susp   Commonly known as:  MILK OF MAGNESIA    Take 30 mL by mouth 4 times a day as needed (constipation).   Dose:  30 mL       metformin 1000 MG tablet   Commonly known as:  GLUCOPHAGE    TAKE ONE TABLET BY MOUTH TWICE DAILY WITH FOOD       metronidazole 500 MG Tabs   Commonly " known as:  FLAGYL    Take 1 Tab by mouth every 8 hours.   Dose:  500 mg       polyethylene glycol/lytes Pack   Commonly known as:  MIRALAX    Take  by mouth every day.       ranitidine 300 MG tablet   Commonly known as:  ZANTAC    Take 1 Tab by mouth every day.   Dose:  300 mg

## 2017-01-04 NOTE — CONSULTS
DATE OF SERVICE:  01/04/2017    PHYSICIAN REQUESTING CONSULTATION:  Ronald Pickens MD    REASON FOR CONSULTATION:  Abdominal pain, possible cholecystitis.    HISTORY OF PRESENT ILLNESS:  The patient is a 66-year-old male who has had   intermittent problems similar to today, he states that he has been unable to   have a bowel movement for 36 hours.  He complains of waxing and waning lower   abdominal pain in both the right and left quadrants, he continues to pass   flatus and has had no fevers or chills.  He states he does have some   diaphoresis when he has peaks of his pain.  He has had no vomiting.  He states   he has been given a pill in the past that has helped him with this, but when   asked what pill this is, he states this is ranitidine.  He was seen in the ER   and a CT scan was performed which showed several large gallstones in the   gallbladder neck with a distended gallbladder and mild gallbladder wall   thickening by CT scan, it was noted that a HIDA scan could be helpful for   confirmation if clinically ambiguous, surgical consultation was obtained.    PAST MEDICAL HISTORY:  1.  Poorly controlled diabetes mellitus.  2.  Hypertension.  3.  Hypercholesterolemia.    ALLERGIES:  ANACIN.    PAST SURGICAL HISTORY:  He has had right chest surgery for pneumonia 37 years   ago.    SOCIAL HISTORY:  He is retired and is accompanied by his family.  He denies   tobacco, alcohol or drug use.    FAMILY HISTORY:  Noncontributory.    REVIEW OF SYSTEMS:  Reveals that he has had multiple similar, but less severe   episodes of similar pain.    MEDICATIONS:  Crestor 40 mg tablets 1 p.o. daily, Norco 10/325 one p.o. p.r.n.   pain, insulin, lisinopril 10 mg p.o. daily, metformin 1000 mg tablets daily,   ranitidine 300 mg tablets daily.    PHYSICAL EXAMINATION:  VITAL SIGNS:  Temperature is 37.2, heart rate 84, respirations 20, blood   pressure 108/62 and O2 saturation 96% on 2 liters.    GENERAL:  He is in no acute  distress and appears his stated age.  HEENT:  Pupils are equal, round and reactive to light.  No scleral icterus.    Extraocular movement is intact.  He has moist oral mucosa.  NECK:  Supple.  No JVD, no lymphadenopathy, no thyromegaly.  LUNGS:  Clear to auscultation bilaterally with a bilateral chest rise.  HEART:  Has a regular rate and rhythm, no murmurs, gallops or rubs.  No   carotid bruits are appreciated.  ABDOMEN:  Nondistended, has positive bowel sounds, soft and is tender   throughout.  He is distinctly more tender in the bilateral lower quadrants.    He is mildly tender in his bilateral upper quadrants.  Umbilical hernias are   not appreciated.  RECTAL:  Not performed.  EXTREMITIES:  2+ pulses in all 4 extremities.  No clubbing, cyanosis or edema.  NEUROLOGIC:  Cranial nerves II-XII are grossly intact.  He has no focal   deficits and his gait is not examined.    LABORATORY DATA:  CBC is remarkable for an elevated white blood cell count at   11.6 and a low platelet count at 98.  Patient is noted to have a left shift   with 76% neutrophils.  A complete metabolic panel is remarkable for a low   potassium at 3.5, an elevated blood glucose of 158.  His LFTs are all within   normal limits and his lactic acid is elevated at 3.0.  A urinalysis shows   trace of glucose and a trace of ketones.  CT scan of the abdomen and pelvis is   as noted in the HPI.    ASSESSMENT:  A 66-year-old male with:  1.  Abdominal wall.    2.  Diabetes mellitus, poorly controlled.  3.  Hypertension.  4.  Hypercholesterolemia.  5.  Thrombocytopenia.  6.  Hypokalemia.    Given the fact that the CT scan is a poor study to evaluate for cholecystitis   and this is really the only evidence that he has cholecystitis, I have   recommended that this patient be admitted to the medicine service to workup   his inability to have a bowel movement.  I have also recommended that a HIDA   scan be performed and if this shows evidence of acute  cholecystitis then the   patient can be taken to the operating room today for a laparoscopic versus   open cholecystectomy.    PLAN:  I recommend admission to the medicine service for correction of his   hyperglycemia, IV hydration, a bowel regimen and a HIDA scan which I will   order from the ER today.       ____________________________________     MD JUDY ROSALES / KIRT    DD:  01/04/2017 07:24:49  DT:  01/04/2017 07:57:10    D#:  [id number]  Job#:  [id number]

## 2017-01-04 NOTE — ED NOTES
"Med rec updated and complete  Allergies reviewed  Pt states \"No antibiotics in the last 30 days\".  Pts daughter states \"I will take home his medications\".    "

## 2017-01-04 NOTE — IP AVS SNAPSHOT
1/8/2017          Yonis De La Cruz  [address] [facility] [place] [address]    Dear Yonis:    [facility] wants to ensure your discharge home is safe and you or your loved ones have had all your questions answered regarding your care after you leave the hospital.    You may receive a telephone call within two days of your discharge.  This call is to make certain you understand your discharge instructions as well as ensure we provided you with the best care possible during your stay with us.     The call will only last approximately 3-5 minutes and will be done by a nurse.    Once again, we want to ensure your discharge home is safe and that you have a clear understanding of any next steps in your care.  If you have any questions or concerns, please do not hesitate to contact us, we are here for you.  Thank you for choosing [facility] for your healthcare needs.    Sincerely,    Neri Leavitt    [facility]

## 2017-01-05 ENCOUNTER — APPOINTMENT (OUTPATIENT)
Dept: RADIOLOGY | Facility: MEDICAL CENTER | Age: 67
DRG: 418 | End: 2017-01-05
Attending: SURGERY
Payer: MEDICAID

## 2017-01-05 PROBLEM — K80.20 GALLSTONES: Status: ACTIVE | Noted: 2017-01-05

## 2017-01-05 PROBLEM — E87.1 HYPONATREMIA: Status: ACTIVE | Noted: 2017-01-05

## 2017-01-05 PROBLEM — D72.829 LEUKOCYTOSIS: Status: ACTIVE | Noted: 2017-01-05

## 2017-01-05 LAB
ALBUMIN SERPL BCP-MCNC: 3.3 G/DL (ref 3.2–4.9)
ALBUMIN/GLOB SERPL: 1.4 G/DL
ALP SERPL-CCNC: 50 U/L (ref 30–99)
ALT SERPL-CCNC: 22 U/L (ref 2–50)
ANION GAP SERPL CALC-SCNC: 9 MMOL/L (ref 0–11.9)
AST SERPL-CCNC: 23 U/L (ref 12–45)
BASOPHILS # BLD AUTO: 0 % (ref 0–1.8)
BASOPHILS # BLD: 0 K/UL (ref 0–0.12)
BILIRUB SERPL-MCNC: 1.2 MG/DL (ref 0.1–1.5)
BUN SERPL-MCNC: 16 MG/DL (ref 8–22)
CALCIUM SERPL-MCNC: 8.6 MG/DL (ref 8.5–10.5)
CHLORIDE SERPL-SCNC: 103 MMOL/L (ref 96–112)
CHOLEST SERPL-MCNC: 96 MG/DL (ref 100–199)
CO2 SERPL-SCNC: 21 MMOL/L (ref 20–33)
CREAT SERPL-MCNC: 0.69 MG/DL (ref 0.5–1.4)
EOSINOPHIL # BLD AUTO: 0 K/UL (ref 0–0.51)
EOSINOPHIL NFR BLD: 0 % (ref 0–6.9)
ERYTHROCYTE [DISTWIDTH] IN BLOOD BY AUTOMATED COUNT: 43.5 FL (ref 35.9–50)
EST. AVERAGE GLUCOSE BLD GHB EST-MCNC: 197 MG/DL
GFR SERPL CREATININE-BSD FRML MDRD: >60 ML/MIN/1.73 M 2
GLOBULIN SER CALC-MCNC: 2.4 G/DL (ref 1.9–3.5)
GLUCOSE BLD-MCNC: 158 MG/DL (ref 65–99)
GLUCOSE BLD-MCNC: 169 MG/DL (ref 65–99)
GLUCOSE BLD-MCNC: 192 MG/DL (ref 65–99)
GLUCOSE BLD-MCNC: 198 MG/DL (ref 65–99)
GLUCOSE BLD-MCNC: 203 MG/DL (ref 65–99)
GLUCOSE SERPL-MCNC: 181 MG/DL (ref 65–99)
GRAM STN SPEC: NORMAL
HBA1C MFR BLD: 8.5 % (ref 0–5.6)
HCT VFR BLD AUTO: 42.3 % (ref 42–52)
HDLC SERPL-MCNC: 36 MG/DL
HGB BLD-MCNC: 14.7 G/DL (ref 14–18)
LDLC SERPL CALC-MCNC: 47 MG/DL
LYMPHOCYTES # BLD AUTO: 0.57 K/UL (ref 1–4.8)
LYMPHOCYTES NFR BLD: 5.2 % (ref 22–41)
MANUAL DIFF BLD: ABNORMAL
MCH RBC QN AUTO: 30.4 PG (ref 27–33)
MCHC RBC AUTO-ENTMCNC: 34.8 G/DL (ref 33.7–35.3)
MCV RBC AUTO: 87.4 FL (ref 81.4–97.8)
MONOCYTES # BLD AUTO: 1.06 K/UL (ref 0–0.85)
MONOCYTES NFR BLD AUTO: 9.6 % (ref 0–13.4)
MORPHOLOGY BLD-IMP: NORMAL
NEUTROPHILS # BLD AUTO: 9.37 K/UL (ref 1.82–7.42)
NEUTROPHILS NFR BLD: 74.8 % (ref 44–72)
NEUTS BAND NFR BLD MANUAL: 10.4 % (ref 0–10)
NRBC # BLD AUTO: 0 K/UL
NRBC BLD AUTO-RTO: 0 /100 WBC
PLATELET # BLD AUTO: 85 K/UL (ref 164–446)
PLATELET BLD QL SMEAR: NORMAL
PMV BLD AUTO: 9.3 FL (ref 9–12.9)
POTASSIUM SERPL-SCNC: 3.8 MMOL/L (ref 3.6–5.5)
PROT SERPL-MCNC: 5.7 G/DL (ref 6–8.2)
RBC # BLD AUTO: 4.84 M/UL (ref 4.7–6.1)
RBC BLD AUTO: NORMAL
SIGNIFICANT IND 70042: NORMAL
SITE SITE: NORMAL
SODIUM SERPL-SCNC: 133 MMOL/L (ref 135–145)
SOURCE SOURCE: NORMAL
TRIGL SERPL-MCNC: 67 MG/DL (ref 0–149)
WBC # BLD AUTO: 11 K/UL (ref 4.8–10.8)

## 2017-01-05 PROCEDURE — 501582 HCHG TROCAR, THRD BLADED: Performed by: SURGERY

## 2017-01-05 PROCEDURE — A4606 OXYGEN PROBE USED W OXIMETER: HCPCS | Performed by: SURGERY

## 2017-01-05 PROCEDURE — 82962 GLUCOSE BLOOD TEST: CPT

## 2017-01-05 PROCEDURE — 160039 HCHG SURGERY MINUTES - EA ADDL 1 MIN LEVEL 3: Performed by: SURGERY

## 2017-01-05 PROCEDURE — 700102 HCHG RX REV CODE 250 W/ 637 OVERRIDE(OP): Performed by: INTERNAL MEDICINE

## 2017-01-05 PROCEDURE — 700112 HCHG RX REV CODE 229: Performed by: INTERNAL MEDICINE

## 2017-01-05 PROCEDURE — 700111 HCHG RX REV CODE 636 W/ 250 OVERRIDE (IP)

## 2017-01-05 PROCEDURE — 0FT44ZZ RESECTION OF GALLBLADDER, PERCUTANEOUS ENDOSCOPIC APPROACH: ICD-10-PCS | Performed by: SURGERY

## 2017-01-05 PROCEDURE — 700101 HCHG RX REV CODE 250

## 2017-01-05 PROCEDURE — 160002 HCHG RECOVERY MINUTES (STAT): Performed by: SURGERY

## 2017-01-05 PROCEDURE — 502240 HCHG MISC OR SUPPLY RC 0272: Performed by: SURGERY

## 2017-01-05 PROCEDURE — 500389 HCHG DRAIN, RESERVOIR SUCT JP 100CC: Performed by: SURGERY

## 2017-01-05 PROCEDURE — 87070 CULTURE OTHR SPECIMN AEROBIC: CPT

## 2017-01-05 PROCEDURE — 501838 HCHG SUTURE GENERAL: Performed by: SURGERY

## 2017-01-05 PROCEDURE — 160009 HCHG ANES TIME/MIN: Performed by: SURGERY

## 2017-01-05 PROCEDURE — 87077 CULTURE AEROBIC IDENTIFY: CPT

## 2017-01-05 PROCEDURE — 700111 HCHG RX REV CODE 636 W/ 250 OVERRIDE (IP): Performed by: INTERNAL MEDICINE

## 2017-01-05 PROCEDURE — 110382 HCHG SHELL REV 271: Performed by: SURGERY

## 2017-01-05 PROCEDURE — 160036 HCHG PACU - EA ADDL 30 MINS PHASE I: Performed by: SURGERY

## 2017-01-05 PROCEDURE — 160028 HCHG SURGERY MINUTES - 1ST 30 MINS LEVEL 3: Performed by: SURGERY

## 2017-01-05 PROCEDURE — 500371 HCHG DRAIN, BLAKE 10MM: Performed by: SURGERY

## 2017-01-05 PROCEDURE — 502571 HCHG PACK, LAP CHOLE: Performed by: SURGERY

## 2017-01-05 PROCEDURE — 85027 COMPLETE CBC AUTOMATED: CPT

## 2017-01-05 PROCEDURE — 87186 SC STD MICRODIL/AGAR DIL: CPT

## 2017-01-05 PROCEDURE — 501584 HCHG TROCAR, THRD CAN&SEAL11X100: Performed by: SURGERY

## 2017-01-05 PROCEDURE — 87075 CULTR BACTERIA EXCEPT BLOOD: CPT

## 2017-01-05 PROCEDURE — 700111 HCHG RX REV CODE 636 W/ 250 OVERRIDE (IP): Performed by: SURGERY

## 2017-01-05 PROCEDURE — 85007 BL SMEAR W/DIFF WBC COUNT: CPT

## 2017-01-05 PROCEDURE — A9270 NON-COVERED ITEM OR SERVICE: HCPCS | Performed by: INTERNAL MEDICINE

## 2017-01-05 PROCEDURE — 87205 SMEAR GRAM STAIN: CPT

## 2017-01-05 PROCEDURE — 500697 HCHG HEMOCLIP, LARGE (ORANGE): Performed by: SURGERY

## 2017-01-05 PROCEDURE — 700101 HCHG RX REV CODE 250: Performed by: INTERNAL MEDICINE

## 2017-01-05 PROCEDURE — 99233 SBSQ HOSP IP/OBS HIGH 50: CPT | Performed by: INTERNAL MEDICINE

## 2017-01-05 PROCEDURE — 78226 HEPATOBILIARY SYSTEM IMAGING: CPT

## 2017-01-05 PROCEDURE — A6402 STERILE GAUZE <= 16 SQ IN: HCPCS | Performed by: SURGERY

## 2017-01-05 PROCEDURE — 500512 HCHG ENDO PEANUT: Performed by: SURGERY

## 2017-01-05 PROCEDURE — 36415 COLL VENOUS BLD VENIPUNCTURE: CPT

## 2017-01-05 PROCEDURE — 160035 HCHG PACU - 1ST 60 MINS PHASE I: Performed by: SURGERY

## 2017-01-05 PROCEDURE — 501338 HCHG SHEARS, ENDO: Performed by: SURGERY

## 2017-01-05 PROCEDURE — 501572 HCHG TROCAR, SHIELD OBTU 5X100: Performed by: SURGERY

## 2017-01-05 PROCEDURE — 770006 HCHG ROOM/CARE - MED/SURG/GYN SEMI*

## 2017-01-05 PROCEDURE — 501399 HCHG SPECIMAN BAG, ENDO CATC: Performed by: SURGERY

## 2017-01-05 PROCEDURE — 160048 HCHG OR STATISTICAL LEVEL 1-5: Performed by: SURGERY

## 2017-01-05 PROCEDURE — 501583 HCHG TROCAR, THRD CAN&SEAL 5X100: Performed by: SURGERY

## 2017-01-05 PROCEDURE — 88304 TISSUE EXAM BY PATHOLOGIST: CPT

## 2017-01-05 PROCEDURE — 80061 LIPID PANEL: CPT

## 2017-01-05 PROCEDURE — 500868 HCHG NEEDLE, SURGI(VARES): Performed by: SURGERY

## 2017-01-05 PROCEDURE — 80053 COMPREHEN METABOLIC PANEL: CPT

## 2017-01-05 PROCEDURE — 110371 HCHG SHELL REV 272: Performed by: SURGERY

## 2017-01-05 RX ORDER — MORPHINE SULFATE 10 MG/ML
INJECTION, SOLUTION INTRAMUSCULAR; INTRAVENOUS
Status: COMPLETED
Start: 2017-01-05 | End: 2017-01-05

## 2017-01-05 RX ORDER — HYDROMORPHONE HYDROCHLORIDE 2 MG/ML
1 INJECTION, SOLUTION INTRAMUSCULAR; INTRAVENOUS; SUBCUTANEOUS
Status: DISCONTINUED | OUTPATIENT
Start: 2017-01-05 | End: 2017-01-08 | Stop reason: HOSPADM

## 2017-01-05 RX ORDER — MIDAZOLAM HYDROCHLORIDE 1 MG/ML
INJECTION INTRAMUSCULAR; INTRAVENOUS
Status: DISPENSED
Start: 2017-01-05 | End: 2017-01-06

## 2017-01-05 RX ORDER — BUPIVACAINE HYDROCHLORIDE AND EPINEPHRINE 5; 5 MG/ML; UG/ML
INJECTION, SOLUTION EPIDURAL; INTRACAUDAL; PERINEURAL
Status: DISCONTINUED | OUTPATIENT
Start: 2017-01-05 | End: 2017-01-05 | Stop reason: HOSPADM

## 2017-01-05 RX ADMIN — DOCUSATE SODIUM 100 MG: 100 CAPSULE ORAL at 11:28

## 2017-01-05 RX ADMIN — HYDROMORPHONE HYDROCHLORIDE 1 MG: 2 INJECTION INTRAMUSCULAR; INTRAVENOUS; SUBCUTANEOUS at 16:27

## 2017-01-05 RX ADMIN — ROSUVASTATIN CALCIUM 40 MG: 20 TABLET ORAL at 21:45

## 2017-01-05 RX ADMIN — HYDROMORPHONE HYDROCHLORIDE 0.2 MG: 1 INJECTION, SOLUTION INTRAMUSCULAR; INTRAVENOUS; SUBCUTANEOUS at 15:50

## 2017-01-05 RX ADMIN — HYDROCODONE BITARTRATE AND ACETAMINOPHEN 0.5 TABLET: 10; 325 TABLET ORAL at 01:05

## 2017-01-05 RX ADMIN — CIPROFLOXACIN 400 MG: 2 INJECTION, SOLUTION INTRAVENOUS at 21:44

## 2017-01-05 RX ADMIN — FAMOTIDINE 20 MG: 20 TABLET, FILM COATED ORAL at 11:28

## 2017-01-05 RX ADMIN — MORPHINE SULFATE 2 MG: 10 INJECTION INTRAVENOUS at 09:27

## 2017-01-05 RX ADMIN — INSULIN LISPRO 4 UNITS: 100 INJECTION, SOLUTION INTRAVENOUS; SUBCUTANEOUS at 20:37

## 2017-01-05 RX ADMIN — FAMOTIDINE 20 MG: 20 TABLET, FILM COATED ORAL at 21:45

## 2017-01-05 RX ADMIN — POTASSIUM CHLORIDE AND SODIUM CHLORIDE: 900; 150 INJECTION, SOLUTION INTRAVENOUS at 03:25

## 2017-01-05 RX ADMIN — METRONIDAZOLE 500 MG: 500 INJECTION, SOLUTION INTRAVENOUS at 11:29

## 2017-01-05 RX ADMIN — HYDROCODONE BITARTRATE AND ACETAMINOPHEN 1 TABLET: 10; 325 TABLET ORAL at 21:45

## 2017-01-05 RX ADMIN — Medication 1 TABLET: at 21:45

## 2017-01-05 RX ADMIN — HYDROMORPHONE HYDROCHLORIDE 1 MG: 2 INJECTION INTRAMUSCULAR; INTRAVENOUS; SUBCUTANEOUS at 19:07

## 2017-01-05 RX ADMIN — HYDROMORPHONE HYDROCHLORIDE 0.2 MG: 1 INJECTION, SOLUTION INTRAMUSCULAR; INTRAVENOUS; SUBCUTANEOUS at 15:45

## 2017-01-05 RX ADMIN — METRONIDAZOLE 500 MG: 500 INJECTION, SOLUTION INTRAVENOUS at 22:00

## 2017-01-05 RX ADMIN — FENTANYL CITRATE 25 MCG: 50 INJECTION, SOLUTION INTRAMUSCULAR; INTRAVENOUS at 15:08

## 2017-01-05 RX ADMIN — INSULIN GLARGINE 15 UNITS: 100 INJECTION, SOLUTION SUBCUTANEOUS at 20:38

## 2017-01-05 RX ADMIN — HYDROMORPHONE HYDROCHLORIDE 0.2 MG: 1 INJECTION, SOLUTION INTRAMUSCULAR; INTRAVENOUS; SUBCUTANEOUS at 15:30

## 2017-01-05 RX ADMIN — CIPROFLOXACIN 400 MG: 2 INJECTION, SOLUTION INTRAVENOUS at 11:30

## 2017-01-05 RX ADMIN — FENTANYL CITRATE 50 MCG: 50 INJECTION, SOLUTION INTRAMUSCULAR; INTRAVENOUS at 15:20

## 2017-01-05 RX ADMIN — HYDROMORPHONE HYDROCHLORIDE 0.2 MG: 1 INJECTION, SOLUTION INTRAMUSCULAR; INTRAVENOUS; SUBCUTANEOUS at 15:25

## 2017-01-05 RX ADMIN — METRONIDAZOLE 500 MG: 500 INJECTION, SOLUTION INTRAVENOUS at 06:00

## 2017-01-05 RX ADMIN — ENOXAPARIN SODIUM 40 MG: 100 INJECTION SUBCUTANEOUS at 11:29

## 2017-01-05 RX ADMIN — FENTANYL CITRATE 25 MCG: 50 INJECTION, SOLUTION INTRAMUSCULAR; INTRAVENOUS at 15:13

## 2017-01-05 ASSESSMENT — ENCOUNTER SYMPTOMS
BACK PAIN: 0
CARDIOVASCULAR NEGATIVE: 1
MYALGIAS: 0
BLOOD IN STOOL: 0
WEAKNESS: 0
HEADACHES: 0
COUGH: 0
RESPIRATORY NEGATIVE: 1
CHILLS: 0
ABDOMINAL PAIN: 1
FEVER: 0
DIZZINESS: 0
PALPITATIONS: 0
HALLUCINATIONS: 0
VOMITING: 1
SHORTNESS OF BREATH: 0
SORE THROAT: 0
NAUSEA: 1
FOCAL WEAKNESS: 0
HEARTBURN: 0
DIARRHEA: 0
DEPRESSION: 0

## 2017-01-05 ASSESSMENT — PAIN SCALES - GENERAL
PAINLEVEL_OUTOF10: 7
PAINLEVEL_OUTOF10: ASSUMED PAIN PRESENT
PAINLEVEL_OUTOF10: 3
PAINLEVEL_OUTOF10: 6
PAINLEVEL_OUTOF10: 8
PAINLEVEL_OUTOF10: 0
PAINLEVEL_OUTOF10: 0
PAINLEVEL_OUTOF10: 4
PAINLEVEL_OUTOF10: 8
PAINLEVEL_OUTOF10: 9

## 2017-01-05 NOTE — OP REPORT
DATE OF SERVICE:  01/05/2017    PREOPERATIVE DIAGNOSIS:  Cholecystitis.    POSTOPERATIVE DIAGNOSIS:  Gangrenous cholecystitis.    INDICATION FOR SURGERY:  This is a 66-year-old male who was admitted on   1/4/2017 with abdominal pain and CT scan findings consistent with   cholecystitis.  His exam was not consistent with cholecystitis at that time,   so a HIDA scan was ordered.  This was performed this morning and showed no   evidence of filling of the gallbladder and he was taken immediately to the   operating room for a laparoscopic versus open cholecystectomy.    PROCEDURE:  Laparoscopic cholecystectomy.    SURGEON:  Dustin Ferreira MD    ASSISTANT:  None.    ANESTHESIOLOGIST:  Micky Thurman MD    ANESTHESIA:  General endotracheal anesthesia.    FINDINGS:  Gangrene of the gallbladder.  Hydrops of the gallbladder.    PROCEDURE NARRATIVE:  Signed informed consent was on the chart at the time of   the procedure.  The patient was brought to the operating room and placed in   the supine position, general endotracheal anesthesia was induced and skin over   the abdomen was prepped and draped in sterile fashion.  The patient was noted   to be receiving appropriate antibiotics and a timeout was performed after   first infusing the skin and soft tissue with local anesthetic, which in this   case was 0.5% Marcaine with epinephrine, a 2 cm periumbilical incision was   made superior to the umbilicus and underlying soft tissue was dissected   through bluntly to the level of the fascia.  The base stalk of the umbilicus   was grasped and lifted and a Veress needle was introduced into the peritoneal   space on the first attempt.  Pneumoperitoneum was achieved without difficulty   and the Veress needle was replaced with an 11 mm trocar through which the   laparoscopic camera was placed.  The underlying viscus was inspected, no   evidence of trauma was appreciated.  After first infusing the skin and soft   tissue with local  anesthetic, a 2 cm horizontal incision was made just to the   patient's right of midline inferior to the costal margin through which an 11   mm port was placed under direct visualization via the camera _____ two 5 mm   ports were placed in the right upper quadrant using similar technique.  The   gallbladder was noted to be gangrenous and unable to be grasped as it was   quite distended.  An aspiration needle was introduced through the superior   midline port site and approximately 30 mL of clear to purulent fluid was   aspirated.  This was sent to the lab for Gram stain, culture and sensitivity.    The gallbladder was then able to be grasped at the fundus of the gallbladder,   although gallbladder was quite fragile and the adhesions to the gallbladder   were carefully taken down.  Eventually, I was able to visualize the   infundibulum.  The infundibulum was grasped and put under gentle tension.    Cystic duct was carefully dissected away from the surrounding soft tissue.    Once the anatomy of the cystic duct was cleared, the cystic duct was clipped 3   times distally and once proximally, being careful not to impinge on the   common bile duct.  Cystic duct was divided between the 2 groups of clips.  The   cystic artery was similarly dissected free of the surrounding soft tissue and   clipped twice proximally, once distally and divided between the 2 groups of   clips, soft tissue attachments between the gallbladder and gallbladder fossa   as well as the peritoneal attachments on either side of the gallbladder were   taken down using careful Bovie cautery.  The gallbladder was very, very   inflamed and this was somewhat difficult and this did take approximately 45   minutes to dissect the gallbladder out of the gallbladder fossa.  During this   dissection, a portion of the back wall of the gallbladder was left in place.    The gallbladder was eventually able to be completely dissected free and placed   into an  EndoCatch bag and withdrawn via the periumbilical port site.  The   port was replaced at that site and the remaining back wall of the gallbladder   was fulgurated.  The gallbladder fossa was irrigated with 3 liters of warm   normal saline, the irrigant returned clear eventually, the gallbladder fossa   was noted to be hemostatic, but given the fact that the patient had received   40 mg of Lovenox just before surgery and he was so inflamed it was decided   that a drain would be placed.  A 10-Egyptian flat fluted drain was inserted into   the peritoneal space with the exit through the more lateral of the 5 mm right   upper quadrant trocars, which was removed.  The drain was secured in place   with a 2-0 nylon suture.  The superior midline port was removed and using an   Endoclose device under direct visualization via the camera, an 0 Vicryl suture   was placed across the fascial defect and tagged.  The port was replaced and   used as access for the camera.  The periumbilical port was removed and using   an Endoclose device under direct visualization via the camera, 2 separate 0   Vicryl sutures were placed across the fascial defect and then tightened and   tied being careful not to include any underlying structures.  The remaining 5   mm port was removed and no evidence of bleeding was noted at that site.  The   superior midline port was removed and pneumoperitoneum was allowed to    the previously placed 0 Vicryl suture at that site was then tightened and   tied.  Each of the 3 remaining incisions was irrigated and dried and each was   closed using a running 4-0 Vicryl subcuticular stitch.  Periumbilical incision   was dressed with 2x2 gauze pad followed by clear Tegaderm and the other 2   incisions were dressed with clear Tegaderms.  The drain was dressed with a   drain sponge, which was held in place with tape.    FLUIDS:  1500 mL crystalloid.    ESTIMATED BLOOD LOSS:  150 mL.     DRAINS:   None.    SPECIMENS:  1.  Gallbladder to pathology.  2.  Gallbladder fluid to the lab for Gram stain, culture and sensitivity.    COMPLICATIONS:  None.    DISPOSITION:  Patient was in guarded condition to post-anesthesia care unit.       ____________________________________     MD JUDY ROSALES / KIRT    DD:  01/05/2017 14:36:49  DT:  01/05/2017 15:44:01    D#:  [id number]  Job#:  [id number]

## 2017-01-05 NOTE — OR SURGEON
Immediate Post-Operative Note      PreOp Diagnosis: Cholecystitis    PostOp Diagnosis: Gangrenous cholecystitis    Procedure(s):  LAPAROSCOPIC CHOLECYSTECTOMY    Surgeon(s):  Dustin Ferreira M.D.    Anesthesiologist/Type of Anesthesia:  Anesthesiologist: Micky Thurman M.D./General    Surgical Staff:  Circulator: Freddy Oliva R.N.  Relief Circulator: Breaan Abdullahi R.N.  Scrub Person: Marley Knox; Ld Gordon    Specimen: Gallbladder    Estimated Blood Loss: 150 cc    Findings: Gangrenous gallbladder.  Hydrops of the gallbladder    Complications: None        1/5/2017 2:27 PM Dustin Ferreira

## 2017-01-05 NOTE — PROGRESS NOTES
Surgical Progress Note    Author: Dustin Ferreira Date & Time created: 2017   11:42 AM     Interval Events:  Complains of increasing right upper quadrant abdominal pain.  HIDA this AM shows no filling of the gallbladder.    Review of Systems   Respiratory: Negative.    Cardiovascular: Negative.    Gastrointestinal: Positive for abdominal pain.     Hemodynamics:  Temp (24hrs), Av.6 °C (99.6 °F), Min:37.2 °C (99 °F), Max:38.3 °C (101 °F)  Temperature: 37.5 °C (99.5 °F)  Pulse  Av.2  Min: 70  Max: 100   Blood Pressure: 134/70 mmHg     Respiratory:    Respiration: 19, Pulse Oximetry: 93 %, O2 Daily Delivery Respiratory : Room Air with O2 Available           Neuro:  GCS = 15       Fluids:    Intake/Output Summary (Last 24 hours) at 17 1142  Last data filed at 17 0815   Gross per 24 hour   Intake    240 ml   Output    300 ml   Net    -60 ml        Current Diet Order   Procedures   • DIET ORDER     Physical Exam   Constitutional: He is oriented to person, place, and time. He appears well-developed and well-nourished. He appears distressed.   HENT:   Head: Normocephalic.   Eyes: Pupils are equal, round, and reactive to light.   Cardiovascular: Normal rate.    Pulmonary/Chest: Effort normal.   Abdominal: Soft. He exhibits no distension. There is tenderness (RUQ). There is no rebound and no guarding.   Neurological: He is alert and oriented to person, place, and time.   Psychiatric: He has a normal mood and affect. His behavior is normal. Judgment and thought content normal.     Labs:  Recent Results (from the past 24 hour(s))   ACCU-CHEK GLUCOSE    Collection Time: 17  4:38 PM   Result Value Ref Range    Glucose - Accu-Ck 181 (H) 65 - 99 mg/dL   ACCU-CHEK GLUCOSE    Collection Time: 17  9:03 PM   Result Value Ref Range    Glucose - Accu-Ck 158 (H) 65 - 99 mg/dL   CBC with Differential    Collection Time: 17  2:16 AM   Result Value Ref Range    WBC 11.0 (H) 4.8 - 10.8 K/uL    RBC  4.84 4.70 - 6.10 M/uL    Hemoglobin 14.7 14.0 - 18.0 g/dL    Hematocrit 42.3 42.0 - 52.0 %    MCV 87.4 81.4 - 97.8 fL    MCH 30.4 27.0 - 33.0 pg    MCHC 34.8 33.7 - 35.3 g/dL    RDW 43.5 35.9 - 50.0 fL    Platelet Count 85 (L) 164 - 446 K/uL    MPV 9.3 9.0 - 12.9 fL    Nucleated RBC 0.00 /100 WBC    NRBC (Absolute) 0.00 K/uL    Neutrophils-Polys 74.80 (H) 44.00 - 72.00 %    Lymphocytes 5.20 (L) 22.00 - 41.00 %    Monocytes 9.60 0.00 - 13.40 %    Eosinophils 0.00 0.00 - 6.90 %    Basophils 0.00 0.00 - 1.80 %    Neutrophils (Absolute) 9.37 (H) 1.82 - 7.42 K/uL    Lymphs (Absolute) 0.57 (L) 1.00 - 4.80 K/uL    Monos (Absolute) 1.06 (H) 0.00 - 0.85 K/uL    Eos (Absolute) 0.00 0.00 - 0.51 K/uL    Baso (Absolute) 0.00 0.00 - 0.12 K/uL   Comp Metabolic Panel (CMP)    Collection Time: 01/05/17  2:16 AM   Result Value Ref Range    Sodium 133 (L) 135 - 145 mmol/L    Potassium 3.8 3.6 - 5.5 mmol/L    Chloride 103 96 - 112 mmol/L    Co2 21 20 - 33 mmol/L    Anion Gap 9.0 0.0 - 11.9    Glucose 181 (H) 65 - 99 mg/dL    Bun 16 8 - 22 mg/dL    Creatinine 0.69 0.50 - 1.40 mg/dL    Calcium 8.6 8.5 - 10.5 mg/dL    AST(SGOT) 23 12 - 45 U/L    ALT(SGPT) 22 2 - 50 U/L    Alkaline Phosphatase 50 30 - 99 U/L    Total Bilirubin 1.2 0.1 - 1.5 mg/dL    Albumin 3.3 3.2 - 4.9 g/dL    Total Protein 5.7 (L) 6.0 - 8.2 g/dL    Globulin 2.4 1.9 - 3.5 g/dL    A-G Ratio 1.4 g/dL   Lipid Profile (Lipid Panel) Fasting    Collection Time: 01/05/17  2:16 AM   Result Value Ref Range    Cholesterol,Tot 96 (L) 100 - 199 mg/dL    Triglycerides 67 0 - 149 mg/dL    HDL 36 (A) >=40 mg/dL    LDL 47 <100 mg/dL   DIFFERENTIAL MANUAL    Collection Time: 01/05/17  2:16 AM   Result Value Ref Range    Bands-Stabs 10.40 (H) 0.00 - 10.00 %    Manual Diff Status PERFORMED    PERIPHERAL SMEAR REVIEW    Collection Time: 01/05/17  2:16 AM   Result Value Ref Range    Peripheral Smear Review see below    PLATELET ESTIMATE    Collection Time: 01/05/17  2:16 AM   Result Value  Ref Range    Plt Estimation Decreased    MORPHOLOGY    Collection Time: 01/05/17  2:16 AM   Result Value Ref Range    RBC Morphology Normal    ESTIMATED GFR    Collection Time: 01/05/17  2:16 AM   Result Value Ref Range    GFR If African American >60 >60 mL/min/1.73 m 2    GFR If Non African American >60 >60 mL/min/1.73 m 2   ACCU-CHEK GLUCOSE    Collection Time: 01/05/17  6:41 AM   Result Value Ref Range    Glucose - Accu-Ck 169 (H) 65 - 99 mg/dL     Medical Decision Making, by Problem:  Active Hospital Problems    Diagnosis   • Gallstones [K80.20]   • Leukocytosis [D72.829]   • Hyponatremia [E87.1]   • Diabetes (HCC) [E11.9]   • Abdominal pain [R10.9]   • Gastroesophageal reflux disease without esophagitis [K21.9]   • Hyperlipidemia [E78.5]     Plan:  To OR at 12:15 today for laparoscopic vs. Open cholecystectomy.    Quality Measures:  Labs reviewed, Medications reviewed and Radiology images reviewed  Martin catheter: No Martin      DVT Prophylaxis: Enoxaparin (Lovenox)              Discussed patient condition with Family, RN and Patient

## 2017-01-05 NOTE — CARE PLAN
Problem: Safety  Goal: Will remain free from injury  Outcome: PROGRESSING AS EXPECTED    Problem: Knowledge Deficit  Goal: Knowledge of disease process/condition, treatment plan, diagnostic tests, and medications will improve  Outcome: PROGRESSING AS EXPECTED

## 2017-01-05 NOTE — PROGRESS NOTES
Received shift report from deondre RN and assumed care of this pt at 0715. Pt AOx4, anxious, resting in bed. SO at bedside. Pt reports pain is 8/10 in abd - Unable to medicate for pain at this time pending HIDA scan at 0800 - Pt educated, repositioned for comfort. Pt is up self, steady gait. Does call appropriately. Bed alarm is off per pt refusal. PIV assessed and is patent, CDI, running NS+20K @ 75ml/hr +IV ABX. Pt is on RA with SaO2 >90%. Pt states priority this shift is pain management. Discussed POC for medications, HIDA scan, mobility, day time routine, comfort, and safety. Patient has call light and personal belongings within reach. Safety and fall precautions in place. Reviewed orders, notes, labs, and test results. Hourly rounding in place with RN rounding on odd hours and CNA on even hours.

## 2017-01-05 NOTE — PROGRESS NOTES
Patient AOX4. Call light within reach. Pain meds given. VSS. Rested well throughout shift. NPO at midnight. Daghter at bedside. Plan of care ongoing.

## 2017-01-05 NOTE — PROGRESS NOTES
Hospital Medicine Progress Note, Adult, Complex               Author: Negin Fernandez Date & Time created: 1/5/2017  1:05 PM     CC: Abdominal pain    Consults:   Dr. Ferreira - surgery    Interval History:  66M hx DM2, HTN, HLD admitted for RUQ pain found to have acute cholecystitis on CT and abnormal HIDA scan, going for lap sara today, remains on IV cipro/flagyl    Review of Systems:  Review of Systems   Constitutional: Negative for fever, chills and malaise/fatigue.   HENT: Negative for sore throat.    Respiratory: Negative for cough and shortness of breath.    Cardiovascular: Negative for chest pain and palpitations.   Gastrointestinal: Positive for nausea, vomiting (Dry Heaves) and abdominal pain. Negative for heartburn, diarrhea and blood in stool.   Genitourinary: Negative for dysuria and frequency.   Musculoskeletal: Negative for myalgias and back pain.   Neurological: Negative for dizziness, focal weakness, weakness and headaches.   Psychiatric/Behavioral: Negative for depression and hallucinations.   All other systems reviewed and are negative.      Physical Exam:  Physical Exam   Constitutional: He is oriented to person, place, and time. He appears well-developed and well-nourished. He appears distressed.   HENT:   Head: Normocephalic.   Mouth/Throat: No oropharyngeal exudate.   Eyes: Conjunctivae are normal. Pupils are equal, round, and reactive to light.   Neck: Normal range of motion. No tracheal deviation present.   Cardiovascular: Normal rate and regular rhythm.    No murmur heard.  Pulmonary/Chest: Effort normal. No respiratory distress. He has no wheezes. He exhibits no tenderness.   Abdominal: Soft. He exhibits no distension. There is tenderness. There is rebound and guarding.   Musculoskeletal: Normal range of motion. He exhibits no edema or tenderness.   Lymphadenopathy:     He has no cervical adenopathy.   Neurological: He is alert and oriented to person, place, and time. No cranial nerve  deficit.   Skin: Skin is warm and dry. No rash noted.   Psychiatric: He has a normal mood and affect. His behavior is normal.   Nursing note and vitals reviewed.      Labs:        Invalid input(s): [id number]      Recent Labs      17   SODIUM  137  133*   POTASSIUM  3.5*  3.8   CHLORIDE  101  103   CO2  26  21   BUN  15  16   CREATININE  0.81  0.69   CALCIUM  9.7  8.6     Recent Labs      17   ALTSGPT  17  22   ASTSGOT  20  23   ALKPHOSPHAT  48  50   TBILIRUBIN  0.9  1.2   LIPASE  28   --    GLUCOSE  158*  181*     Recent Labs      17   RBC  5.18  4.84   HEMOGLOBIN  15.6  14.7   HEMATOCRIT  44.6  42.3   PLATELETCT  98*  85*     Recent Labs      17   WBC  11.6*  11.0*   NEUTSPOLYS  76.50*  74.80*   LYMPHOCYTES  4.40*  5.20*   MONOCYTES  2.60  9.60   EOSINOPHILS  0.00  0.00   BASOPHILS  0.00  0.00   ASTSGOT  20  23   ALTSGPT  17  22   ALKPHOSPHAT  48  50   TBILIRUBIN  0.9  1.2           Hemodynamics:  Temp (24hrs), Av.7 °C (99.8 °F), Min:37.2 °C (99 °F), Max:38.8 °C (101.8 °F)  Temperature: (!) 38.8 °C (101.8 °F) (anesthesia notified)  Pulse  Av.2  Min: 70  Max: 100   Blood Pressure: 141/73 mmHg     Respiratory:    Respiration: 18, Pulse Oximetry: 91 %, O2 Daily Delivery Respiratory : Room Air with O2 Available           Fluids:    Intake/Output Summary (Last 24 hours) at 17 1305  Last data filed at 17 0815   Gross per 24 hour   Intake    240 ml   Output    300 ml   Net    -60 ml        GI/Nutrition:  Orders Placed This Encounter   Procedures   • DIET NPO     Standing Status: Standing      Number of Occurrences: 1      Standing Expiration Date:      Order Specific Question:  Restrict to:     Answer:  Strict [1]     Medical Decision Making, by Problem:  Active Hospital Problems    Diagnosis   • *Acute Cholecystitis - seen on CT and HIDA, has severe abdominal pain and  leukocytosis  -For Lap sara toda  -IV morphine PRN  -IVF  -Supportive care  -IV cipro/flagyl     • Chronic Neck pain - takes norco as outpatient but says this makes him nauseated  -Tylenol  -PRN morphine  -Trial of oxy     • Hyponatremia [E87.1]  -From volume depletion  -IVF  -Repeat in AM     • Diabetes (HCC) [E11.9]  -SSI  -Lantus 15U nightly     • Gastroesophageal reflux disease without esophagitis [K21.9]  -Pepcid     • Hyperlipidemia [E78.5]  -Crestor       Labs reviewed, Medications reviewed and Radiology images reviewed  Martin catheter: No Martin      DVT Prophylaxis: Enoxaparin (Lovenox)      Antibiotics: Treating active infection/contamination beyond 24 hours perioperative coverage

## 2017-01-05 NOTE — PROGRESS NOTES
Pt returned to T313-2; Per pt and med transport, pt medicated prn w IV morphine prior to returning to room

## 2017-01-05 NOTE — DISCHARGE PLANNING
Medical Social Work    SW met with pt, pt's family, and pt's spiritual guidance counselors. The spiritual guidance counselors had a different type of Medical Power of  Paperwork than this SW had seen before that stated that the pt did not wish to receive blood during surgery. MONICO called Nicolette Villeda, at [phone] who was able to come by and verified that the documentation was legal. Pt is having surgery today. Nicolette reported that she would scan the document and put in the EPIC system.

## 2017-01-06 LAB
ALBUMIN SERPL BCP-MCNC: 3 G/DL (ref 3.2–4.9)
ALBUMIN/GLOB SERPL: 1.3 G/DL
ALP SERPL-CCNC: 40 U/L (ref 30–99)
ALT SERPL-CCNC: 48 U/L (ref 2–50)
ANION GAP SERPL CALC-SCNC: 6 MMOL/L (ref 0–11.9)
ANISOCYTOSIS BLD QL SMEAR: ABNORMAL
AST SERPL-CCNC: 46 U/L (ref 12–45)
BASOPHILS # BLD AUTO: 0 % (ref 0–1.8)
BASOPHILS # BLD: 0 K/UL (ref 0–0.12)
BILIRUB SERPL-MCNC: 1 MG/DL (ref 0.1–1.5)
BUN SERPL-MCNC: 19 MG/DL (ref 8–22)
CALCIUM SERPL-MCNC: 8.2 MG/DL (ref 8.5–10.5)
CHLORIDE SERPL-SCNC: 104 MMOL/L (ref 96–112)
CO2 SERPL-SCNC: 24 MMOL/L (ref 20–33)
CREAT SERPL-MCNC: 0.78 MG/DL (ref 0.5–1.4)
EOSINOPHIL # BLD AUTO: 0 K/UL (ref 0–0.51)
EOSINOPHIL NFR BLD: 0 % (ref 0–6.9)
ERYTHROCYTE [DISTWIDTH] IN BLOOD BY AUTOMATED COUNT: 43.1 FL (ref 35.9–50)
GFR SERPL CREATININE-BSD FRML MDRD: >60 ML/MIN/1.73 M 2
GLOBULIN SER CALC-MCNC: 2.4 G/DL (ref 1.9–3.5)
GLUCOSE BLD-MCNC: 208 MG/DL (ref 65–99)
GLUCOSE BLD-MCNC: 214 MG/DL (ref 65–99)
GLUCOSE BLD-MCNC: 256 MG/DL (ref 65–99)
GLUCOSE SERPL-MCNC: 284 MG/DL (ref 65–99)
HBV SURFACE AG SER QL: NEGATIVE
HCT VFR BLD AUTO: 42.5 % (ref 42–52)
HCV AB SER QL: NEGATIVE
HGB BLD-MCNC: 14.6 G/DL (ref 14–18)
HIV 1+2 AB+HIV1 P24 AG SERPL QL IA: NON REACTIVE
LIPASE SERPL-CCNC: 6 U/L (ref 11–82)
LYMPHOCYTES # BLD AUTO: 0.44 K/UL (ref 1–4.8)
LYMPHOCYTES NFR BLD: 6.2 % (ref 22–41)
MAGNESIUM SERPL-MCNC: 1.6 MG/DL (ref 1.5–2.5)
MANUAL DIFF BLD: NORMAL
MCH RBC QN AUTO: 30.2 PG (ref 27–33)
MCHC RBC AUTO-ENTMCNC: 34.4 G/DL (ref 33.7–35.3)
MCV RBC AUTO: 88 FL (ref 81.4–97.8)
MICROCYTES BLD QL SMEAR: ABNORMAL
MONOCYTES # BLD AUTO: 0.5 K/UL (ref 0–0.85)
MONOCYTES NFR BLD AUTO: 7 % (ref 0–13.4)
MORPHOLOGY BLD-IMP: NORMAL
NEUTROPHILS # BLD AUTO: 6.16 K/UL (ref 1.82–7.42)
NEUTROPHILS NFR BLD: 80.7 % (ref 44–72)
NEUTS BAND NFR BLD MANUAL: 6.1 % (ref 0–10)
NRBC # BLD AUTO: 0 K/UL
NRBC BLD AUTO-RTO: 0 /100 WBC
OVALOCYTES BLD QL SMEAR: NORMAL
PHOSPHATE SERPL-MCNC: 2.6 MG/DL (ref 2.5–4.5)
PLATELET # BLD AUTO: 83 K/UL (ref 164–446)
PLATELET BLD QL SMEAR: NORMAL
PMV BLD AUTO: 9.6 FL (ref 9–12.9)
POIKILOCYTOSIS BLD QL SMEAR: NORMAL
POTASSIUM SERPL-SCNC: 4.4 MMOL/L (ref 3.6–5.5)
PROT SERPL-MCNC: 5.4 G/DL (ref 6–8.2)
RBC # BLD AUTO: 4.83 M/UL (ref 4.7–6.1)
RBC BLD AUTO: PRESENT
SODIUM SERPL-SCNC: 134 MMOL/L (ref 135–145)
WBC # BLD AUTO: 7.1 K/UL (ref 4.8–10.8)

## 2017-01-06 PROCEDURE — 85027 COMPLETE CBC AUTOMATED: CPT

## 2017-01-06 PROCEDURE — A9270 NON-COVERED ITEM OR SERVICE: HCPCS | Performed by: INTERNAL MEDICINE

## 2017-01-06 PROCEDURE — 700102 HCHG RX REV CODE 250 W/ 637 OVERRIDE(OP): Performed by: INTERNAL MEDICINE

## 2017-01-06 PROCEDURE — 83735 ASSAY OF MAGNESIUM: CPT

## 2017-01-06 PROCEDURE — 700111 HCHG RX REV CODE 636 W/ 250 OVERRIDE (IP): Performed by: SURGERY

## 2017-01-06 PROCEDURE — 99356 PR PROLONGED SVC I/P OR OBS SETTING 1ST HOUR: CPT | Performed by: INTERNAL MEDICINE

## 2017-01-06 PROCEDURE — 82962 GLUCOSE BLOOD TEST: CPT

## 2017-01-06 PROCEDURE — 770006 HCHG ROOM/CARE - MED/SURG/GYN SEMI*

## 2017-01-06 PROCEDURE — 85007 BL SMEAR W/DIFF WBC COUNT: CPT

## 2017-01-06 PROCEDURE — 700102 HCHG RX REV CODE 250 W/ 637 OVERRIDE(OP): Performed by: SURGERY

## 2017-01-06 PROCEDURE — 84100 ASSAY OF PHOSPHORUS: CPT

## 2017-01-06 PROCEDURE — 700101 HCHG RX REV CODE 250: Performed by: INTERNAL MEDICINE

## 2017-01-06 PROCEDURE — 80053 COMPREHEN METABOLIC PANEL: CPT

## 2017-01-06 PROCEDURE — 83690 ASSAY OF LIPASE: CPT

## 2017-01-06 PROCEDURE — 700105 HCHG RX REV CODE 258: Performed by: INTERNAL MEDICINE

## 2017-01-06 PROCEDURE — 36415 COLL VENOUS BLD VENIPUNCTURE: CPT

## 2017-01-06 PROCEDURE — A9270 NON-COVERED ITEM OR SERVICE: HCPCS | Performed by: SURGERY

## 2017-01-06 PROCEDURE — 700111 HCHG RX REV CODE 636 W/ 250 OVERRIDE (IP): Performed by: INTERNAL MEDICINE

## 2017-01-06 PROCEDURE — 700112 HCHG RX REV CODE 229: Performed by: INTERNAL MEDICINE

## 2017-01-06 PROCEDURE — 700101 HCHG RX REV CODE 250: Performed by: SURGERY

## 2017-01-06 PROCEDURE — 99233 SBSQ HOSP IP/OBS HIGH 50: CPT | Mod: 25 | Performed by: INTERNAL MEDICINE

## 2017-01-06 RX ORDER — INSULIN GLARGINE 100 [IU]/ML
18 INJECTION, SOLUTION SUBCUTANEOUS EVERY EVENING
Status: DISCONTINUED | OUTPATIENT
Start: 2017-01-06 | End: 2017-01-08 | Stop reason: HOSPADM

## 2017-01-06 RX ORDER — BISACODYL 10 MG
10 SUPPOSITORY, RECTAL RECTAL DAILY
Status: DISCONTINUED | OUTPATIENT
Start: 2017-01-06 | End: 2017-01-08 | Stop reason: HOSPADM

## 2017-01-06 RX ORDER — POLYETHYLENE GLYCOL 3350 17 G/17G
1 POWDER, FOR SOLUTION ORAL DAILY
Status: DISCONTINUED | OUTPATIENT
Start: 2017-01-06 | End: 2017-01-08 | Stop reason: HOSPADM

## 2017-01-06 RX ORDER — SODIUM CHLORIDE 9 MG/ML
INJECTION, SOLUTION INTRAVENOUS CONTINUOUS
Status: DISCONTINUED | OUTPATIENT
Start: 2017-01-06 | End: 2017-01-08 | Stop reason: HOSPADM

## 2017-01-06 RX ADMIN — METRONIDAZOLE 500 MG: 500 INJECTION, SOLUTION INTRAVENOUS at 14:24

## 2017-01-06 RX ADMIN — FAMOTIDINE 20 MG: 20 TABLET, FILM COATED ORAL at 08:18

## 2017-01-06 RX ADMIN — BISACODYL 10 MG: 10 SUPPOSITORY RECTAL at 10:37

## 2017-01-06 RX ADMIN — POTASSIUM CHLORIDE AND SODIUM CHLORIDE: 900; 150 INJECTION, SOLUTION INTRAVENOUS at 03:08

## 2017-01-06 RX ADMIN — HYDROCODONE BITARTRATE AND ACETAMINOPHEN 1 TABLET: 10; 325 TABLET ORAL at 08:18

## 2017-01-06 RX ADMIN — INSULIN LISPRO 7 UNITS: 100 INJECTION, SOLUTION INTRAVENOUS; SUBCUTANEOUS at 06:02

## 2017-01-06 RX ADMIN — HYDROCODONE BITARTRATE AND ACETAMINOPHEN 1 TABLET: 10; 325 TABLET ORAL at 19:21

## 2017-01-06 RX ADMIN — DOCUSATE SODIUM 100 MG: 100 CAPSULE ORAL at 08:18

## 2017-01-06 RX ADMIN — METRONIDAZOLE 500 MG: 500 INJECTION, SOLUTION INTRAVENOUS at 06:02

## 2017-01-06 RX ADMIN — ROSUVASTATIN CALCIUM 40 MG: 20 TABLET ORAL at 21:00

## 2017-01-06 RX ADMIN — POLYETHYLENE GLYCOL 3350 1 PACKET: 17 POWDER, FOR SOLUTION ORAL at 17:10

## 2017-01-06 RX ADMIN — SODIUM CHLORIDE: 9 INJECTION, SOLUTION INTRAVENOUS at 08:23

## 2017-01-06 RX ADMIN — HYDROMORPHONE HYDROCHLORIDE 1 MG: 2 INJECTION INTRAMUSCULAR; INTRAVENOUS; SUBCUTANEOUS at 14:24

## 2017-01-06 RX ADMIN — INSULIN GLARGINE 18 UNITS: 100 INJECTION, SOLUTION SUBCUTANEOUS at 21:09

## 2017-01-06 RX ADMIN — CIPROFLOXACIN 400 MG: 2 INJECTION, SOLUTION INTRAVENOUS at 08:19

## 2017-01-06 RX ADMIN — LACTULOSE 30 ML: 10 SOLUTION ORAL at 12:23

## 2017-01-06 RX ADMIN — FAMOTIDINE 20 MG: 20 TABLET, FILM COATED ORAL at 21:00

## 2017-01-06 RX ADMIN — METRONIDAZOLE 500 MG: 500 INJECTION, SOLUTION INTRAVENOUS at 21:00

## 2017-01-06 RX ADMIN — HYDROMORPHONE HYDROCHLORIDE 1 MG: 2 INJECTION INTRAMUSCULAR; INTRAVENOUS; SUBCUTANEOUS at 03:09

## 2017-01-06 RX ADMIN — CIPROFLOXACIN 400 MG: 2 INJECTION, SOLUTION INTRAVENOUS at 22:18

## 2017-01-06 RX ADMIN — Medication 1 TABLET: at 21:00

## 2017-01-06 RX ADMIN — SODIUM CHLORIDE: 9 INJECTION, SOLUTION INTRAVENOUS at 21:00

## 2017-01-06 RX ADMIN — MAGNESIUM HYDROXIDE 30 ML: 400 SUSPENSION ORAL at 17:10

## 2017-01-06 ASSESSMENT — ENCOUNTER SYMPTOMS
ABDOMINAL PAIN: 1
VOMITING: 0
HEADACHES: 0
DEPRESSION: 0
HALLUCINATIONS: 0
ABDOMINAL PAIN: 0
SHORTNESS OF BREATH: 0
COUGH: 0
DIZZINESS: 0
BACK PAIN: 0
PALPITATIONS: 0
NAUSEA: 0
FEVER: 0
MYALGIAS: 0
FOCAL WEAKNESS: 0
BLOOD IN STOOL: 0
SORE THROAT: 0
CHILLS: 0
HEARTBURN: 0
DIARRHEA: 0
WEAKNESS: 0

## 2017-01-06 ASSESSMENT — PAIN SCALES - GENERAL
PAINLEVEL_OUTOF10: 7
PAINLEVEL_OUTOF10: 7
PAINLEVEL_OUTOF10: 0
PAINLEVEL_OUTOF10: 0
PAINLEVEL_OUTOF10: 10
PAINLEVEL_OUTOF10: 10
PAINLEVEL_OUTOF10: 6
PAINLEVEL_OUTOF10: 7

## 2017-01-06 ASSESSMENT — COPD QUESTIONNAIRES
DO YOU EVER COUGH UP ANY MUCUS OR PHLEGM?: NO/ONLY WITH OCCASIONAL COLDS OR INFECTIONS
DURING THE PAST 4 WEEKS HOW MUCH DID YOU FEEL SHORT OF BREATH: NONE/LITTLE OF THE TIME
HAVE YOU SMOKED AT LEAST 100 CIGARETTES IN YOUR ENTIRE LIFE: NO/DON'T KNOW
COPD SCREENING SCORE: 0

## 2017-01-06 ASSESSMENT — LIFESTYLE VARIABLES: DO YOU DRINK ALCOHOL: NO

## 2017-01-06 NOTE — PROGRESS NOTES
Hospital Medicine Progress Note, Adult, Complex               Author: Negin Fernandez Date & Time created: 1/6/2017  3:40 PM     CC: Abdominal pain    Consults:   Dr. Ferreira - surgery    Interval History:  66M hx DM2, HTN, HLD admitted for RUQ pain found to have acute cholecystitis on CT and abnormal HIDA scan, POD1 s/p lap sara today, remains on IV cipro/flagyl  1/6: Diet advanced to full liquid, no BM.  Suppository given    Review of Systems:  Review of Systems   Constitutional: Negative for fever, chills and malaise/fatigue.   HENT: Negative for sore throat.    Respiratory: Negative for cough and shortness of breath.    Cardiovascular: Negative for chest pain and palpitations.   Gastrointestinal: Negative for heartburn, nausea, vomiting, abdominal pain, diarrhea and blood in stool.   Genitourinary: Negative for dysuria and frequency.   Musculoskeletal: Negative for myalgias and back pain.   Neurological: Negative for dizziness, focal weakness, weakness and headaches.   Psychiatric/Behavioral: Negative for depression and hallucinations.   All other systems reviewed and are negative.      Physical Exam:  Physical Exam   Constitutional: He is oriented to person, place, and time. He appears well-developed and well-nourished. No distress.   Sitting up in chair   HENT:   Head: Normocephalic.   Mouth/Throat: No oropharyngeal exudate.   Eyes: Conjunctivae are normal. Pupils are equal, round, and reactive to light.   Neck: Normal range of motion. No tracheal deviation present.   Cardiovascular: Normal rate and regular rhythm.    No murmur heard.  Pulmonary/Chest: Effort normal. No respiratory distress. He has no wheezes. He exhibits no tenderness.   Abdominal: Soft. He exhibits no distension. There is tenderness (Minimal). There is no rebound and no guarding.   Surgical sites, c/d/i   Musculoskeletal: Normal range of motion. He exhibits no edema or tenderness.   Lymphadenopathy:     He has no cervical adenopathy.    Neurological: He is alert and oriented to person, place, and time. No cranial nerve deficit.   Skin: Skin is warm and dry. No rash noted.   Psychiatric: He has a normal mood and affect. His behavior is normal.   Nursing note and vitals reviewed.      Labs:        Invalid input(s): [id number]      Recent Labs      17   020   SODIUM  137  133*  134*   POTASSIUM  3.5*  3.8  4.4   CHLORIDE  101  103  104   CO2  26  21  24   BUN  15  16  19   CREATININE  0.81  0.69  0.78   MAGNESIUM   --    --   1.6   PHOSPHORUS   --    --   2.6   CALCIUM  9.7  8.6  8.2*     Recent Labs      17   020   ALTSGPT  17  22  48   ASTSGOT  20  23  46*   ALKPHOSPHAT  48  50  40   TBILIRUBIN  0.9  1.2  1.0   LIPASE  28   --   6*   GLUCOSE  158*  181*  284*     Recent Labs      17   020   RBC  5.18  4.84  4.83   HEMOGLOBIN  15.6  14.7  14.6   HEMATOCRIT  44.6  42.3  42.5   PLATELETCT  98*  85*  83*     Recent Labs      17   020   WBC  11.6*  11.0*  7.1   NEUTSPOLYS  76.50*  74.80*  80.70*   LYMPHOCYTES  4.40*  5.20*  6.20*   MONOCYTES  2.60  9.60  7.00   EOSINOPHILS  0.00  0.00  0.00   BASOPHILS  0.00  0.00  0.00   ASTSGOT  20  23  46*   ALTSGPT  17  22  48   ALKPHOSPHAT  48  50  40   TBILIRUBIN  0.9  1.2  1.0       Hemodynamics:  Temp (24hrs), Av.8 °C (98.3 °F), Min:36.1 °C (96.9 °F), Max:37.4 °C (99.4 °F)  Temperature: 37.4 °C (99.4 °F)  Pulse  Av.5  Min: 70  Max: 107Heart Rate (Monitored): 93  Blood Pressure: 112/63 mmHg, NIBP: 144/72 mmHg     Respiratory:    Respiration: 18, Pulse Oximetry: 92 %        RUL Breath Sounds: Diminished, RML Breath Sounds: Diminished, RLL Breath Sounds: Diminished, SHARONA Breath Sounds: Diminished, LLL Breath Sounds: Diminished  Fluids:    Intake/Output Summary (Last 24 hours) at 17 1540  Last data filed at 17 1200   Gross per 24  hour   Intake    400 ml   Output    500 ml   Net   -100 ml        GI/Nutrition:  Orders Placed This Encounter   Procedures   • DIET ORDER     Standing Status: Standing      Number of Occurrences: 1      Standing Expiration Date:      Order Specific Question:  Diet:     Answer:  Full Liquid [11]     Order Specific Question:  Diet:     Answer:  Diabetic [3]     Medical Decision Making, by Problem:  Active Hospital Problems    Diagnosis   • *Acute Cholecystitis - seen on CT and HIDA, POD 1 s/p lap sara, WBC normalized  -IV morphine PRN  -  -Supportive care  -IV cipro/flagyl  -Full liquid diet  -Suppository     • Chronic Neck pain - takes norco as outpatient but says this makes him nauseated  -Tylenol  -PRN morphine  -Trial of oxy     • Hyponatremia [E87.1]  -From volume depletion  -IVF  -Repeat in AM     • Diabetes (HCC) [E11.9]- sugars this AM are high  -SSI  -Increase Lantus to 18U nightly     • Gastroesophageal reflux disease without esophagitis [K21.9]  -Pepcid     • Hyperlipidemia [E78.5]  -Crestor     Dispo: DC in 24-48 pending PO and BM    30 minutes were spent discussing course and plan with family at bedside, this was in addition to the time of the initial visit with the patient for total time of 55min.  All questions answered.  Greater than 50% of this time was at the patient's bedside.      Labs reviewed, Medications reviewed and Radiology images reviewed  Martin catheter: No Martin      DVT Prophylaxis: Enoxaparin (Lovenox)      Antibiotics: Treating active infection/contamination beyond 24 hours perioperative coverage

## 2017-01-06 NOTE — CARE PLAN
Problem: Infection  Goal: Will remain free from infection  Pt with surgical removal of gangrenous gallbladder By Dr. Ferreira on 1/5/17.  On antibiotics.    Problem: Venous Thromboembolism (VTW)/Deep Vein Thrombosis (DVT) Prevention:  Goal: Patient will participate in Venous Thrombosis (VTE)/Deep Vein Thrombosis (DVT)Prevention Measures  Pt receiving lovenox for DVT/VTE prevention.    Problem: Bowel/Gastric:  Goal: Normal bowel function is maintained or improved  Pt has not had a bowel movement since prior to arrival.  Administered suppository, stool softener and lactulose per MAR.    Problem: Knowledge Deficit  Goal: Knowledge of disease process/condition, treatment plan, diagnostic tests, and medications will improve  Educated pt and family on disease process.  Received verbal understanding.  Answered questions.

## 2017-01-06 NOTE — PROGRESS NOTES
Received report from night shift RN. Assumed care of patient. Pt assessed and stable. VSS. Patient reports 7/10 pain at this time.  Discussed plan of care for day with patient and family and received verbal understanding. Call light within reach, bed in low position.  Educated pt re fall precautions and received verbal understanding.  However, pt continues to refuse bed alarms.  Pt is alert, oriented, steady on feet and calls appropriately.

## 2017-01-06 NOTE — CARE PLAN
Problem: Safety  Goal: Will remain free from injury  Outcome: PROGRESSING AS EXPECTED  Fall precautions in place. Bed in lowest position. Non-skid socks in place. Personal possessions within reach. Mobility sign on door. Call light within reach. Pt educated regarding fall prevention and states understanding.     Problem: Psychosocial Needs:  Goal: Level of anxiety will decrease  Outcome: PROGRESSING AS EXPECTED  Pts , family, and SO at bedside offering emotional support

## 2017-01-06 NOTE — PROGRESS NOTES
Received report from PACU RN, Marzena; Pt returned to T313-2 via hospital bed w hospital escort; Family at bedside; Pt A&Ox4, subdued, calm;  c/o pain 9/10 - Medicated prn per MAR; Lap stab sites to abdomen x2, SANDRA drain to R flank w min sanguinous drainage    Paged to MD for diet order

## 2017-01-06 NOTE — PROGRESS NOTES
Surgical Progress Note    Author: Dustin Ferreira Date & Time created: 2017   8:55 AM     Interval Events:  Urine output not recorded in milliliters.  No suppository administered despite orders for bowel regimen and patient with no BM since admission.  Received clear liquid diet this morning.  Denies nausea, unsure about flatus.    Review of Systems   Gastrointestinal: Positive for abdominal pain.     Hemodynamics:  Temp (24hrs), Av.9 °C (98.5 °F), Min:36.1 °C (96.9 °F), Max:38.8 °C (101.8 °F)  Temperature: 37.2 °C (98.9 °F)  Pulse  Av.9  Min: 70  Max: 107Heart Rate (Monitored): 93  Blood Pressure: 114/70 mmHg, NIBP: 144/72 mmHg     Respiratory:    Respiration: 16, Pulse Oximetry: 90 %        RUL Breath Sounds: Clear, RML Breath Sounds: Diminished, RLL Breath Sounds: Diminished, SHARONA Breath Sounds: Clear, LLL Breath Sounds: Diminished  Neuro:  GCS = 15       Fluids:    Intake/Output Summary (Last 24 hours) at 17 0855  Last data filed at 17 0400   Gross per 24 hour   Intake   1900 ml   Output    100 ml   Net   1800 ml        Current Diet Order   Procedures   • DIET ORDER     Physical Exam   Constitutional: He is oriented to person, place, and time. He appears well-developed and well-nourished. No distress.   Eyes: Pupils are equal, round, and reactive to light. No scleral icterus.   Cardiovascular: Normal rate and regular rhythm.    Pulmonary/Chest: Effort normal and breath sounds normal. No respiratory distress.   Abdominal: Soft. He exhibits no distension. There is tenderness (at incisions). There is no rebound and no guarding.   Decreased bowel sounds present.  Drain with 100 cc bloody output post op recorded.  Incisions with extensive echymosis   Neurological: He is alert and oriented to person, place, and time.   Psychiatric: He has a normal mood and affect. His behavior is normal. Judgment and thought content normal.     Labs:  Recent Results (from the past 24 hour(s))   ACCU-CHEK  GLUCOSE    Collection Time: 01/05/17 12:32 PM   Result Value Ref Range    Glucose - Accu-Ck 192 (H) 65 - 99 mg/dL   GRAM STAIN    Collection Time: 01/05/17  2:11 PM   Result Value Ref Range    Significant Indicator .     Source BF     Site Bile Fluid     Gram Stain Result Moderate WBCs.  No organisms seen.      ACCU-CHEK GLUCOSE    Collection Time: 01/05/17  2:42 PM   Result Value Ref Range    Glucose - Accu-Ck 198 (H) 65 - 99 mg/dL   ACCU-CHEK GLUCOSE    Collection Time: 01/05/17  8:35 PM   Result Value Ref Range    Glucose - Accu-Ck 203 (H) 65 - 99 mg/dL   COMP METABOLIC PANEL    Collection Time: 01/06/17  2:05 AM   Result Value Ref Range    Sodium 134 (L) 135 - 145 mmol/L    Potassium 4.4 3.6 - 5.5 mmol/L    Chloride 104 96 - 112 mmol/L    Co2 24 20 - 33 mmol/L    Anion Gap 6.0 0.0 - 11.9    Glucose 284 (H) 65 - 99 mg/dL    Bun 19 8 - 22 mg/dL    Creatinine 0.78 0.50 - 1.40 mg/dL    Calcium 8.2 (L) 8.5 - 10.5 mg/dL    AST(SGOT) 46 (H) 12 - 45 U/L    ALT(SGPT) 48 2 - 50 U/L    Alkaline Phosphatase 40 30 - 99 U/L    Total Bilirubin 1.0 0.1 - 1.5 mg/dL    Albumin 3.0 (L) 3.2 - 4.9 g/dL    Total Protein 5.4 (L) 6.0 - 8.2 g/dL    Globulin 2.4 1.9 - 3.5 g/dL    A-G Ratio 1.3 g/dL   MAGNESIUM    Collection Time: 01/06/17  2:05 AM   Result Value Ref Range    Magnesium 1.6 1.5 - 2.5 mg/dL   PHOSPHORUS    Collection Time: 01/06/17  2:05 AM   Result Value Ref Range    Phosphorus 2.6 2.5 - 4.5 mg/dL   CBC WITH DIFFERENTIAL    Collection Time: 01/06/17  2:05 AM   Result Value Ref Range    WBC 7.1 4.8 - 10.8 K/uL    RBC 4.83 4.70 - 6.10 M/uL    Hemoglobin 14.6 14.0 - 18.0 g/dL    Hematocrit 42.5 42.0 - 52.0 %    MCV 88.0 81.4 - 97.8 fL    MCH 30.2 27.0 - 33.0 pg    MCHC 34.4 33.7 - 35.3 g/dL    RDW 43.1 35.9 - 50.0 fL    Platelet Count 83 (L) 164 - 446 K/uL    MPV 9.6 9.0 - 12.9 fL    Nucleated RBC 0.00 /100 WBC    NRBC (Absolute) 0.00 K/uL    Neutrophils-Polys 80.70 (H) 44.00 - 72.00 %    Lymphocytes 6.20 (L) 22.00 - 41.00 %     Monocytes 7.00 0.00 - 13.40 %    Eosinophils 0.00 0.00 - 6.90 %    Basophils 0.00 0.00 - 1.80 %    Neutrophils (Absolute) 6.16 1.82 - 7.42 K/uL    Lymphs (Absolute) 0.44 (L) 1.00 - 4.80 K/uL    Monos (Absolute) 0.50 0.00 - 0.85 K/uL    Eos (Absolute) 0.00 0.00 - 0.51 K/uL    Baso (Absolute) 0.00 0.00 - 0.12 K/uL    Anisocytosis 1+     Microcytosis 1+    LIPASE    Collection Time: 01/06/17  2:05 AM   Result Value Ref Range    Lipase 6 (L) 11 - 82 U/L   ESTIMATED GFR    Collection Time: 01/06/17  2:05 AM   Result Value Ref Range    GFR If African American >60 >60 mL/min/1.73 m 2    GFR If Non African American >60 >60 mL/min/1.73 m 2   DIFFERENTIAL MANUAL    Collection Time: 01/06/17  2:05 AM   Result Value Ref Range    Bands-Stabs 6.10 0.00 - 10.00 %    Manual Diff Status PERFORMED    PERIPHERAL SMEAR REVIEW    Collection Time: 01/06/17  2:05 AM   Result Value Ref Range    Peripheral Smear Review see below    PLATELET ESTIMATE    Collection Time: 01/06/17  2:05 AM   Result Value Ref Range    Plt Estimation Decreased    MORPHOLOGY    Collection Time: 01/06/17  2:05 AM   Result Value Ref Range    RBC Morphology Present     Poikilocytosis 1+     Ovalocytes 1+    ACCU-CHEK GLUCOSE    Collection Time: 01/06/17  6:01 AM   Result Value Ref Range    Glucose - Accu-Ck 256 (H) 65 - 99 mg/dL   Infectious Disease Testing (Exposure)    Collection Time: 01/06/17  6:49 AM   Result Value Ref Range    Hepatitis B Surface Antigen Negative Negative    Hepatitis C Antibody Negative Negative     Medical Decision Making, by Problem:  Active Hospital Problems    Diagnosis   • Gallstones [K80.20]   • Leukocytosis [D72.829]   • Hyponatremia [E87.1]   • Diabetes (HCC) [E11.9]   • Abdominal pain [R10.9]   • Gastroesophageal reflux disease without esophagitis [K21.9]   • Hyperlipidemia [E78.5]     Plan:  Will change diet to diabetic full liquid diet.  Have asked that urine output be measured in milliliters.  Have asked that dulcolax  suppository be given this morning.  Okay to restart lovenox, will order.  AM labs tomorrow.    Quality Measures:  Labs reviewed and Medications reviewed  Martin catheter: No Martin                    Discussed patient condition with Family and Patient

## 2017-01-07 LAB
ALBUMIN SERPL BCP-MCNC: 3 G/DL (ref 3.2–4.9)
ALBUMIN/GLOB SERPL: 1.3 G/DL
ALP SERPL-CCNC: 40 U/L (ref 30–99)
ALT SERPL-CCNC: 44 U/L (ref 2–50)
ANION GAP SERPL CALC-SCNC: 8 MMOL/L (ref 0–11.9)
ANISOCYTOSIS BLD QL SMEAR: ABNORMAL
AST SERPL-CCNC: 43 U/L (ref 12–45)
BACTERIA FLD AEROBE CULT: ABNORMAL
BACTERIA FLD AEROBE CULT: ABNORMAL
BASOPHILS # BLD AUTO: 0 % (ref 0–1.8)
BASOPHILS # BLD: 0 K/UL (ref 0–0.12)
BILIRUB SERPL-MCNC: 0.9 MG/DL (ref 0.1–1.5)
BUN SERPL-MCNC: 16 MG/DL (ref 8–22)
BURR CELLS BLD QL SMEAR: NORMAL
CALCIUM SERPL-MCNC: 8 MG/DL (ref 8.5–10.5)
CHLORIDE SERPL-SCNC: 102 MMOL/L (ref 96–112)
CO2 SERPL-SCNC: 23 MMOL/L (ref 20–33)
CREAT SERPL-MCNC: 0.55 MG/DL (ref 0.5–1.4)
EOSINOPHIL # BLD AUTO: 0 K/UL (ref 0–0.51)
EOSINOPHIL NFR BLD: 0 % (ref 0–6.9)
ERYTHROCYTE [DISTWIDTH] IN BLOOD BY AUTOMATED COUNT: 41.8 FL (ref 35.9–50)
GFR SERPL CREATININE-BSD FRML MDRD: >60 ML/MIN/1.73 M 2
GLOBULIN SER CALC-MCNC: 2.3 G/DL (ref 1.9–3.5)
GLUCOSE BLD-MCNC: 136 MG/DL (ref 65–99)
GLUCOSE BLD-MCNC: 156 MG/DL (ref 65–99)
GLUCOSE BLD-MCNC: 177 MG/DL (ref 65–99)
GLUCOSE BLD-MCNC: 193 MG/DL (ref 65–99)
GLUCOSE BLD-MCNC: 211 MG/DL (ref 65–99)
GLUCOSE SERPL-MCNC: 164 MG/DL (ref 65–99)
GRAM STN SPEC: ABNORMAL
HCT VFR BLD AUTO: 35.2 % (ref 42–52)
HGB BLD-MCNC: 11.9 G/DL (ref 14–18)
LYMPHOCYTES # BLD AUTO: 0.43 K/UL (ref 1–4.8)
LYMPHOCYTES NFR BLD: 6 % (ref 22–41)
MANUAL DIFF BLD: NORMAL
MCH RBC QN AUTO: 29.3 PG (ref 27–33)
MCHC RBC AUTO-ENTMCNC: 33.8 G/DL (ref 33.7–35.3)
MCV RBC AUTO: 86.7 FL (ref 81.4–97.8)
MICROCYTES BLD QL SMEAR: ABNORMAL
MONOCYTES # BLD AUTO: 0.37 K/UL (ref 0–0.85)
MONOCYTES NFR BLD AUTO: 5.2 % (ref 0–13.4)
MORPHOLOGY BLD-IMP: NORMAL
NEUTROPHILS # BLD AUTO: 6.39 K/UL (ref 1.82–7.42)
NEUTROPHILS NFR BLD: 85.3 % (ref 44–72)
NEUTS BAND NFR BLD MANUAL: 3.5 % (ref 0–10)
NRBC # BLD AUTO: 0 K/UL
NRBC BLD AUTO-RTO: 0 /100 WBC
OVALOCYTES BLD QL SMEAR: NORMAL
PLATELET # BLD AUTO: 106 K/UL (ref 164–446)
PLATELET BLD QL SMEAR: NORMAL
PMV BLD AUTO: 9.2 FL (ref 9–12.9)
POIKILOCYTOSIS BLD QL SMEAR: NORMAL
POTASSIUM SERPL-SCNC: 3.6 MMOL/L (ref 3.6–5.5)
PROT SERPL-MCNC: 5.3 G/DL (ref 6–8.2)
RBC # BLD AUTO: 4.06 M/UL (ref 4.7–6.1)
RBC BLD AUTO: PRESENT
SIGNIFICANT IND 70042: ABNORMAL
SITE SITE: ABNORMAL
SODIUM SERPL-SCNC: 133 MMOL/L (ref 135–145)
SOURCE SOURCE: ABNORMAL
WBC # BLD AUTO: 7.2 K/UL (ref 4.8–10.8)

## 2017-01-07 PROCEDURE — 85007 BL SMEAR W/DIFF WBC COUNT: CPT

## 2017-01-07 PROCEDURE — 770006 HCHG ROOM/CARE - MED/SURG/GYN SEMI*

## 2017-01-07 PROCEDURE — 99356 PR PROLONGED SVC I/P OR OBS SETTING 1ST HOUR: CPT | Performed by: INTERNAL MEDICINE

## 2017-01-07 PROCEDURE — A9270 NON-COVERED ITEM OR SERVICE: HCPCS | Performed by: INTERNAL MEDICINE

## 2017-01-07 PROCEDURE — 82962 GLUCOSE BLOOD TEST: CPT | Mod: 91

## 2017-01-07 PROCEDURE — 700105 HCHG RX REV CODE 258: Performed by: INTERNAL MEDICINE

## 2017-01-07 PROCEDURE — A9270 NON-COVERED ITEM OR SERVICE: HCPCS | Performed by: SURGERY

## 2017-01-07 PROCEDURE — 700102 HCHG RX REV CODE 250 W/ 637 OVERRIDE(OP): Performed by: INTERNAL MEDICINE

## 2017-01-07 PROCEDURE — 700101 HCHG RX REV CODE 250: Performed by: INTERNAL MEDICINE

## 2017-01-07 PROCEDURE — 700102 HCHG RX REV CODE 250 W/ 637 OVERRIDE(OP): Performed by: SURGERY

## 2017-01-07 PROCEDURE — 99232 SBSQ HOSP IP/OBS MODERATE 35: CPT | Mod: 25 | Performed by: INTERNAL MEDICINE

## 2017-01-07 PROCEDURE — 36415 COLL VENOUS BLD VENIPUNCTURE: CPT

## 2017-01-07 PROCEDURE — 85027 COMPLETE CBC AUTOMATED: CPT

## 2017-01-07 PROCEDURE — 700111 HCHG RX REV CODE 636 W/ 250 OVERRIDE (IP): Performed by: INTERNAL MEDICINE

## 2017-01-07 PROCEDURE — 80053 COMPREHEN METABOLIC PANEL: CPT

## 2017-01-07 PROCEDURE — 700112 HCHG RX REV CODE 229: Performed by: INTERNAL MEDICINE

## 2017-01-07 RX ADMIN — POLYETHYLENE GLYCOL 3350 1 PACKET: 17 POWDER, FOR SOLUTION ORAL at 07:59

## 2017-01-07 RX ADMIN — HYDROCODONE BITARTRATE AND ACETAMINOPHEN 1 TABLET: 10; 325 TABLET ORAL at 11:56

## 2017-01-07 RX ADMIN — CIPROFLOXACIN 400 MG: 2 INJECTION, SOLUTION INTRAVENOUS at 07:59

## 2017-01-07 RX ADMIN — FAMOTIDINE 20 MG: 20 TABLET, FILM COATED ORAL at 21:10

## 2017-01-07 RX ADMIN — BISACODYL 10 MG: 10 SUPPOSITORY RECTAL at 07:59

## 2017-01-07 RX ADMIN — FAMOTIDINE 20 MG: 20 TABLET, FILM COATED ORAL at 07:59

## 2017-01-07 RX ADMIN — METRONIDAZOLE 500 MG: 500 INJECTION, SOLUTION INTRAVENOUS at 14:03

## 2017-01-07 RX ADMIN — HYDROCODONE BITARTRATE AND ACETAMINOPHEN 1 TABLET: 10; 325 TABLET ORAL at 03:40

## 2017-01-07 RX ADMIN — CIPROFLOXACIN 400 MG: 2 INJECTION, SOLUTION INTRAVENOUS at 22:28

## 2017-01-07 RX ADMIN — MAGNESIUM HYDROXIDE 30 ML: 400 SUSPENSION ORAL at 07:59

## 2017-01-07 RX ADMIN — INSULIN GLARGINE 18 UNITS: 100 INJECTION, SOLUTION SUBCUTANEOUS at 21:14

## 2017-01-07 RX ADMIN — DOCUSATE SODIUM 100 MG: 100 CAPSULE ORAL at 07:59

## 2017-01-07 RX ADMIN — ROSUVASTATIN CALCIUM 40 MG: 20 TABLET ORAL at 21:10

## 2017-01-07 RX ADMIN — SODIUM CHLORIDE: 9 INJECTION, SOLUTION INTRAVENOUS at 14:03

## 2017-01-07 RX ADMIN — METRONIDAZOLE 500 MG: 500 INJECTION, SOLUTION INTRAVENOUS at 21:10

## 2017-01-07 RX ADMIN — METRONIDAZOLE 500 MG: 500 INJECTION, SOLUTION INTRAVENOUS at 06:06

## 2017-01-07 ASSESSMENT — PAIN SCALES - GENERAL
PAINLEVEL_OUTOF10: 7
PAINLEVEL_OUTOF10: 4
PAINLEVEL_OUTOF10: 4
PAINLEVEL_OUTOF10: 8
PAINLEVEL_OUTOF10: 2
PAINLEVEL_OUTOF10: 2

## 2017-01-07 ASSESSMENT — ENCOUNTER SYMPTOMS
MYALGIAS: 0
HALLUCINATIONS: 0
HEARTBURN: 0
PALPITATIONS: 0
CONSTIPATION: 1
FEVER: 0
VOMITING: 0
BRUISES/BLEEDS EASILY: 1
NAUSEA: 0
WEAKNESS: 0
SORE THROAT: 0
BACK PAIN: 0
DEPRESSION: 0
BLOOD IN STOOL: 0
DIZZINESS: 0
SHORTNESS OF BREATH: 0
CHILLS: 0
COUGH: 0
FOCAL WEAKNESS: 0
HEADACHES: 0
DIARRHEA: 0
ABDOMINAL PAIN: 1

## 2017-01-07 NOTE — PROGRESS NOTES
Surgical Progress Note    Author: Dustin Ferreira Date & Time created: 2017   10:18 AM     Interval Events:  Given dulcolax suppository yesterday and this morning.  Now passing flatus and feels urge to defacate, but has not yet has a bowel movement  Tolerating ADA full liquid diet.  Pain well controlled.    Review of Systems   Gastrointestinal: Positive for abdominal pain and constipation. Negative for nausea and vomiting.     Hemodynamics:  Temp (24hrs), Av.4 °C (99.4 °F), Min:37.1 °C (98.8 °F), Max:37.9 °C (100.3 °F)  Temperature: 37.9 °C (100.3 °F)  Pulse  Av.7  Min: 70  Max: 116   Blood Pressure: 130/73 mmHg     Respiratory:    Respiration: 18, Pulse Oximetry: 97 %        RUL Breath Sounds: Diminished, RML Breath Sounds: Diminished, RLL Breath Sounds: Diminished, SHARONA Breath Sounds: Diminished, LLL Breath Sounds: Diminished  Neuro:  GCS = 15       Fluids:    Intake/Output Summary (Last 24 hours) at 17 1018  Last data filed at 17 0400   Gross per 24 hour   Intake   1200 ml   Output   1655 ml   Net   -455 ml        Current Diet Order   Procedures   • DIET ORDER     Physical Exam   Constitutional: He is oriented to person, place, and time. He appears well-developed and well-nourished. No distress.   HENT:   Head: Normocephalic.   Eyes: Pupils are equal, round, and reactive to light.   Cardiovascular: Normal rate.    Pulmonary/Chest: Effort normal. No respiratory distress.   Abdominal: Soft. Bowel sounds are normal. He exhibits no distension. There is tenderness (at incisions). There is no rebound and no guarding.   Incisions intact, with extensive ecchymosis surrounding them.  SANDRA drain with 30 cc bloody output recorded in last 24 hours   Neurological: He is alert and oriented to person, place, and time.   Psychiatric: He has a normal mood and affect. His behavior is normal. Judgment and thought content normal.     Labs:  Recent Results (from the past 24 hour(s))   ACCU-CHEK GLUCOSE     Collection Time: 01/06/17 11:07 AM   Result Value Ref Range    Glucose - Accu-Ck 214 (H) 65 - 99 mg/dL   ACCU-CHEK GLUCOSE    Collection Time: 01/06/17  4:32 PM   Result Value Ref Range    Glucose - Accu-Ck 208 (H) 65 - 99 mg/dL   ACCU-CHEK GLUCOSE    Collection Time: 01/06/17  9:07 PM   Result Value Ref Range    Glucose - Accu-Ck 177 (H) 65 - 99 mg/dL   COMP METABOLIC PANEL    Collection Time: 01/07/17  3:18 AM   Result Value Ref Range    Sodium 133 (L) 135 - 145 mmol/L    Potassium 3.6 3.6 - 5.5 mmol/L    Chloride 102 96 - 112 mmol/L    Co2 23 20 - 33 mmol/L    Anion Gap 8.0 0.0 - 11.9    Glucose 164 (H) 65 - 99 mg/dL    Bun 16 8 - 22 mg/dL    Creatinine 0.55 0.50 - 1.40 mg/dL    Calcium 8.0 (L) 8.5 - 10.5 mg/dL    AST(SGOT) 43 12 - 45 U/L    ALT(SGPT) 44 2 - 50 U/L    Alkaline Phosphatase 40 30 - 99 U/L    Total Bilirubin 0.9 0.1 - 1.5 mg/dL    Albumin 3.0 (L) 3.2 - 4.9 g/dL    Total Protein 5.3 (L) 6.0 - 8.2 g/dL    Globulin 2.3 1.9 - 3.5 g/dL    A-G Ratio 1.3 g/dL   CBC WITH DIFFERENTIAL    Collection Time: 01/07/17  3:18 AM   Result Value Ref Range    WBC 7.2 4.8 - 10.8 K/uL    RBC 4.06 (L) 4.70 - 6.10 M/uL    Hemoglobin 11.9 (L) 14.0 - 18.0 g/dL    Hematocrit 35.2 (L) 42.0 - 52.0 %    MCV 86.7 81.4 - 97.8 fL    MCH 29.3 27.0 - 33.0 pg    MCHC 33.8 33.7 - 35.3 g/dL    RDW 41.8 35.9 - 50.0 fL    Platelet Count 106 (L) 164 - 446 K/uL    MPV 9.2 9.0 - 12.9 fL    Nucleated RBC 0.00 /100 WBC    NRBC (Absolute) 0.00 K/uL    Neutrophils-Polys 85.30 (H) 44.00 - 72.00 %    Lymphocytes 6.00 (L) 22.00 - 41.00 %    Monocytes 5.20 0.00 - 13.40 %    Eosinophils 0.00 0.00 - 6.90 %    Basophils 0.00 0.00 - 1.80 %    Neutrophils (Absolute) 6.39 1.82 - 7.42 K/uL    Lymphs (Absolute) 0.43 (L) 1.00 - 4.80 K/uL    Monos (Absolute) 0.37 0.00 - 0.85 K/uL    Eos (Absolute) 0.00 0.00 - 0.51 K/uL    Baso (Absolute) 0.00 0.00 - 0.12 K/uL    Anisocytosis 1+     Microcytosis 1+    ESTIMATED GFR    Collection Time: 01/07/17  3:18 AM    Result Value Ref Range    GFR If African American >60 >60 mL/min/1.73 m 2    GFR If Non African American >60 >60 mL/min/1.73 m 2   DIFFERENTIAL MANUAL    Collection Time: 01/07/17  3:18 AM   Result Value Ref Range    Bands-Stabs 3.50 0.00 - 10.00 %    Manual Diff Status PERFORMED    PERIPHERAL SMEAR REVIEW    Collection Time: 01/07/17  3:18 AM   Result Value Ref Range    Peripheral Smear Review see below    PLATELET ESTIMATE    Collection Time: 01/07/17  3:18 AM   Result Value Ref Range    Plt Estimation Decreased    MORPHOLOGY    Collection Time: 01/07/17  3:18 AM   Result Value Ref Range    RBC Morphology Present     Poikilocytosis 1+     Ovalocytes 1+     Echinocytes 1+    ACCU-CHEK GLUCOSE    Collection Time: 01/07/17  6:08 AM   Result Value Ref Range    Glucose - Accu-Ck 156 (H) 65 - 99 mg/dL     Medical Decision Making, by Problem:  Active Hospital Problems    Diagnosis   • Gallstones [K80.20]   • Leukocytosis [D72.829]   • Hyponatremia [E87.1]   • Diabetes (HCC) [E11.9]   • Abdominal pain [R10.9]   • Gastroesophageal reflux disease without esophagitis [K21.9]   • Hyperlipidemia [E78.5]     Plan:  Continue full liquid diet.  Discussed with nurse my recommendation that patient be given enema today.  Discharge home when able, follow up with me, Dr. Olmos in 10-14 days.  D/C drain today.    Quality Measures:  Medications reviewed  Martin catheter: No Martin                    Discussed patient condition with Family, RN and Patient

## 2017-01-07 NOTE — CARE PLAN
Problem: Safety  Goal: Will remain free from injury  Outcome: PROGRESSING AS EXPECTED  Appropriate interventions in place. Remains free from injury.

## 2017-01-07 NOTE — CARE PLAN
Problem: Communication  Goal: The ability to communicate needs accurately and effectively will improve  POC discussed. Encouraged verbalization of concerns regarding care. Addressed concerns as appropriate.    Problem: Pain Management  Goal: Pain level will decrease to patient’s comfort goal  Pain medications given per MAR. Other non-pharmacologic measures for pain initiated.    Problem: Respiratory:  Goal: Respiratory status will improve  O2 titrated to maintain saturation above 90%. Encouraged the use if IS. On 2L 02

## 2017-01-07 NOTE — CARE PLAN
Problem: Pain Management  Goal: Pain level will decrease to patient’s comfort goal  Outcome: PROGRESSING AS EXPECTED  Pt at comfort goal with current pain regimen.

## 2017-01-07 NOTE — PROGRESS NOTES
RLQ drain removed. Suture removed, drain pulled, intact. Moderate amount of bleeding, pressure held. Approximated with steri strips, 4x4, tegaderm. Bleeding appears to have stopped. Will monitor. Pt tolerated well.

## 2017-01-07 NOTE — PROGRESS NOTES
Pt alert in bed, oriented x 4. Reports abdominal cramping, denies nausea, tolerating liquid diet. Abdomen with covered surgical lap site and significant periumbilical bruising, SANDRA draining scan serosanguinous fluids. Hypo bowel sounds x 4. Reports significant time without bowel movement, but is passing gas. Pt encouraged to ambulate. RA trial attempted, pt dropped to 85% saturation, oxygen returned to 2 liters, incentive spirometer encouraged, demonstrates use to 1000. IVF infusing as ordered. Family at bedside, Pt and family updated on plan, denies further needs at this time.

## 2017-01-08 VITALS
DIASTOLIC BLOOD PRESSURE: 79 MMHG | WEIGHT: 158.73 LBS | OXYGEN SATURATION: 91 % | SYSTOLIC BLOOD PRESSURE: 135 MMHG | TEMPERATURE: 99.9 F | RESPIRATION RATE: 16 BRPM | HEART RATE: 88 BPM | HEIGHT: 67 IN | BODY MASS INDEX: 24.91 KG/M2

## 2017-01-08 LAB
ALBUMIN SERPL BCP-MCNC: 2.7 G/DL (ref 3.2–4.9)
ALBUMIN/GLOB SERPL: 1.1 G/DL
ALP SERPL-CCNC: 64 U/L (ref 30–99)
ALT SERPL-CCNC: 40 U/L (ref 2–50)
ANION GAP SERPL CALC-SCNC: 6 MMOL/L (ref 0–11.9)
AST SERPL-CCNC: 39 U/L (ref 12–45)
BACTERIA SPEC ANAEROBE CULT: NORMAL
BILIRUB SERPL-MCNC: 0.8 MG/DL (ref 0.1–1.5)
BUN SERPL-MCNC: 12 MG/DL (ref 8–22)
CALCIUM SERPL-MCNC: 7.8 MG/DL (ref 8.5–10.5)
CHLORIDE SERPL-SCNC: 103 MMOL/L (ref 96–112)
CO2 SERPL-SCNC: 23 MMOL/L (ref 20–33)
CREAT SERPL-MCNC: 0.52 MG/DL (ref 0.5–1.4)
ERYTHROCYTE [DISTWIDTH] IN BLOOD BY AUTOMATED COUNT: 41.7 FL (ref 35.9–50)
GFR SERPL CREATININE-BSD FRML MDRD: >60 ML/MIN/1.73 M 2
GLOBULIN SER CALC-MCNC: 2.4 G/DL (ref 1.9–3.5)
GLUCOSE BLD-MCNC: 159 MG/DL (ref 65–99)
GLUCOSE BLD-MCNC: 235 MG/DL (ref 65–99)
GLUCOSE SERPL-MCNC: 189 MG/DL (ref 65–99)
HCT VFR BLD AUTO: 33 % (ref 42–52)
HGB BLD-MCNC: 11.6 G/DL (ref 14–18)
MCH RBC QN AUTO: 30.2 PG (ref 27–33)
MCHC RBC AUTO-ENTMCNC: 35.2 G/DL (ref 33.7–35.3)
MCV RBC AUTO: 85.9 FL (ref 81.4–97.8)
PLATELET # BLD AUTO: 105 K/UL (ref 164–446)
PMV BLD AUTO: 9.1 FL (ref 9–12.9)
POTASSIUM SERPL-SCNC: 3.5 MMOL/L (ref 3.6–5.5)
PROT SERPL-MCNC: 5.1 G/DL (ref 6–8.2)
RBC # BLD AUTO: 3.84 M/UL (ref 4.7–6.1)
SIGNIFICANT IND 70042: NORMAL
SITE SITE: NORMAL
SODIUM SERPL-SCNC: 132 MMOL/L (ref 135–145)
SOURCE SOURCE: NORMAL
WBC # BLD AUTO: 8.4 K/UL (ref 4.8–10.8)

## 2017-01-08 PROCEDURE — 700111 HCHG RX REV CODE 636 W/ 250 OVERRIDE (IP): Performed by: INTERNAL MEDICINE

## 2017-01-08 PROCEDURE — 85027 COMPLETE CBC AUTOMATED: CPT

## 2017-01-08 PROCEDURE — 700102 HCHG RX REV CODE 250 W/ 637 OVERRIDE(OP): Performed by: INTERNAL MEDICINE

## 2017-01-08 PROCEDURE — A9270 NON-COVERED ITEM OR SERVICE: HCPCS | Performed by: INTERNAL MEDICINE

## 2017-01-08 PROCEDURE — 700111 HCHG RX REV CODE 636 W/ 250 OVERRIDE (IP): Performed by: SURGERY

## 2017-01-08 PROCEDURE — 700101 HCHG RX REV CODE 250: Performed by: INTERNAL MEDICINE

## 2017-01-08 PROCEDURE — 700112 HCHG RX REV CODE 229: Performed by: INTERNAL MEDICINE

## 2017-01-08 PROCEDURE — 700105 HCHG RX REV CODE 258: Performed by: INTERNAL MEDICINE

## 2017-01-08 PROCEDURE — 36415 COLL VENOUS BLD VENIPUNCTURE: CPT

## 2017-01-08 PROCEDURE — 80053 COMPREHEN METABOLIC PANEL: CPT

## 2017-01-08 PROCEDURE — 99239 HOSP IP/OBS DSCHRG MGMT >30: CPT | Performed by: INTERNAL MEDICINE

## 2017-01-08 PROCEDURE — 82962 GLUCOSE BLOOD TEST: CPT

## 2017-01-08 RX ORDER — ACETAMINOPHEN 325 MG/1
650 TABLET ORAL EVERY 6 HOURS PRN
Status: DISCONTINUED | OUTPATIENT
Start: 2017-01-08 | End: 2017-01-08 | Stop reason: HOSPADM

## 2017-01-08 RX ORDER — ACETAMINOPHEN 325 MG/1
650 TABLET ORAL EVERY 6 HOURS PRN
Qty: 30 TAB | Refills: 0 | COMMUNITY
Start: 2017-01-08

## 2017-01-08 RX ORDER — CIPROFLOXACIN 500 MG/1
500 TABLET, FILM COATED ORAL 2 TIMES DAILY
Qty: 12 TAB | Refills: 0 | Status: SHIPPED | OUTPATIENT
Start: 2017-01-08 | End: 2017-01-24

## 2017-01-08 RX ORDER — POLYETHYLENE GLYCOL 3350 17 G/17G
POWDER, FOR SOLUTION ORAL DAILY
Refills: 3 | COMMUNITY
Start: 2017-01-08

## 2017-01-08 RX ORDER — METRONIDAZOLE 500 MG/1
500 TABLET ORAL EVERY 8 HOURS
Qty: 18 TAB | Refills: 0 | Status: SHIPPED | OUTPATIENT
Start: 2017-01-08 | End: 2017-01-24

## 2017-01-08 RX ADMIN — POLYETHYLENE GLYCOL 3350 1 PACKET: 17 POWDER, FOR SOLUTION ORAL at 08:28

## 2017-01-08 RX ADMIN — CIPROFLOXACIN 400 MG: 2 INJECTION, SOLUTION INTRAVENOUS at 08:28

## 2017-01-08 RX ADMIN — METRONIDAZOLE 500 MG: 500 INJECTION, SOLUTION INTRAVENOUS at 05:55

## 2017-01-08 RX ADMIN — FAMOTIDINE 20 MG: 20 TABLET, FILM COATED ORAL at 08:28

## 2017-01-08 RX ADMIN — HYDROMORPHONE HYDROCHLORIDE 1 MG: 2 INJECTION INTRAMUSCULAR; INTRAVENOUS; SUBCUTANEOUS at 12:09

## 2017-01-08 RX ADMIN — DOCUSATE SODIUM 100 MG: 100 CAPSULE ORAL at 08:28

## 2017-01-08 RX ADMIN — LABETALOL HYDROCHLORIDE 10 MG: 5 INJECTION, SOLUTION INTRAVENOUS at 05:55

## 2017-01-08 RX ADMIN — ACETAMINOPHEN 650 MG: 325 TABLET, FILM COATED ORAL at 08:28

## 2017-01-08 RX ADMIN — SODIUM CHLORIDE: 9 INJECTION, SOLUTION INTRAVENOUS at 03:25

## 2017-01-08 ASSESSMENT — ENCOUNTER SYMPTOMS
NAUSEA: 0
VOMITING: 0
CARDIOVASCULAR NEGATIVE: 1
RESPIRATORY NEGATIVE: 1
ABDOMINAL PAIN: 1

## 2017-01-08 ASSESSMENT — PAIN SCALES - GENERAL
PAINLEVEL_OUTOF10: 8
PAINLEVEL_OUTOF10: 7
PAINLEVEL_OUTOF10: 2

## 2017-01-08 NOTE — PROGRESS NOTES
Pt discharged home with family. Escorted with CNA via wheelchair. Instructions reviewed with pt, with assistance from daughter for translation. Pt and family deny questions. Prescriptions given.

## 2017-01-08 NOTE — CARE PLAN
Problem: Pain Management  Goal: Pain level will decrease to patient’s comfort goal  Outcome: PROGRESSING SLOWER THAN EXPECTED  Pt continuous to complain of pain, however; is only willing to take tylenol at this time.

## 2017-01-08 NOTE — PROGRESS NOTES
Hospital Medicine Progress Note, Adult, Complex               Author: Negin Fernandez Date & Time created: 1/7/2017  4:00 PM     CC: Abdominal pain    Consults:   Dr. Ferreira - surgery    Interval History:  66M hx DM2, HTN, HLD admitted for RUQ pain found to have acute cholecystitis on CT and abnormal HIDA scan, POD2 s/p lap sara today, remains on IV cipro/flagyl  1/6: Diet advanced to full liquid, no BM.  Suppository given  1/7: Passing gas, aggressive bowel reg in place.  SANDRA drain discontinued.  Full liquid diet    Review of Systems:  Review of Systems   Constitutional: Negative for fever, chills and malaise/fatigue.   HENT: Negative for sore throat.    Respiratory: Negative for cough and shortness of breath.    Cardiovascular: Negative for chest pain and palpitations.   Gastrointestinal: Positive for abdominal pain. Negative for heartburn, nausea, vomiting, diarrhea and blood in stool.   Genitourinary: Negative for dysuria and frequency.   Musculoskeletal: Negative for myalgias and back pain.   Neurological: Negative for dizziness, focal weakness, weakness and headaches.   Endo/Heme/Allergies: Bruises/bleeds easily (Under surgical incisions).   Psychiatric/Behavioral: Negative for depression and hallucinations.   All other systems reviewed and are negative.      Physical Exam:  Physical Exam   Constitutional: He is oriented to person, place, and time. He appears well-developed and well-nourished. No distress.   HENT:   Head: Normocephalic.   Mouth/Throat: No oropharyngeal exudate.   Eyes: Conjunctivae are normal. Pupils are equal, round, and reactive to light.   Neck: Normal range of motion. No tracheal deviation present.   Cardiovascular: Normal rate and regular rhythm.    No murmur heard.  Pulmonary/Chest: Effort normal. No respiratory distress. He has no wheezes. He exhibits no tenderness.   Abdominal: Soft. He exhibits no distension. There is tenderness (Minimal). There is no rebound and no guarding.    Surgical sites, c/d/i.  Large ecchymosis below them.  Palpable superficial hematoma over lower abdomen   Musculoskeletal: Normal range of motion. He exhibits no edema or tenderness.   Lymphadenopathy:     He has no cervical adenopathy.   Neurological: He is alert and oriented to person, place, and time. No cranial nerve deficit.   Skin: Skin is warm and dry. No rash noted.   Psychiatric: He has a normal mood and affect. His behavior is normal.   Nursing note and vitals reviewed.      Labs:        Invalid input(s): [id number]      Recent Labs      17   SODIUM  133*  134*  133*   POTASSIUM  3.8  4.4  3.6   CHLORIDE  103  104  102   CO2  21  24  23   BUN  16  19  16   CREATININE  0.69  0.78  0.55   MAGNESIUM   --   1.6   --    PHOSPHORUS   --   2.6   --    CALCIUM  8.6  8.2*  8.0*     Recent Labs      17   ALTSGPT  22  48  44   ASTSGOT  23  46*  43   ALKPHOSPHAT  50  40  40   TBILIRUBIN  1.2  1.0  0.9   LIPASE   --   6*   --    GLUCOSE  181*  284*  164*     Recent Labs      17   RBC  4.84  4.83  4.06*   HEMOGLOBIN  14.7  14.6  11.9*   HEMATOCRIT  42.3  42.5  35.2*   PLATELETCT  85*  83*  106*     Recent Labs      17   WBC  11.0*  7.1  7.2   NEUTSPOLYS  74.80*  80.70*  85.30*   LYMPHOCYTES  5.20*  6.20*  6.00*   MONOCYTES  9.60  7.00  5.20   EOSINOPHILS  0.00  0.00  0.00   BASOPHILS  0.00  0.00  0.00   ASTSGOT  23  46*  43   ALTSGPT  22  48  44   ALKPHOSPHAT  50  40  40   TBILIRUBIN  1.2  1.0  0.9       Hemodynamics:  Temp (24hrs), Av.4 °C (99.3 °F), Min:37.1 °C (98.8 °F), Max:37.9 °C (100.3 °F)  Temperature: 37.1 °C (98.8 °F)  Pulse  Av.7  Min: 70  Max: 116   Blood Pressure: 132/74 mmHg     Respiratory:    Respiration: 16, Pulse Oximetry: 96 %        RUL Breath Sounds: Clear, RML Breath Sounds: Clear, RLL Breath Sounds:  Diminished, SHARONA Breath Sounds: Clear, LLL Breath Sounds: Diminished  Fluids:    Intake/Output Summary (Last 24 hours) at 01/07/17 1600  Last data filed at 01/07/17 0400   Gross per 24 hour   Intake   1200 ml   Output   1255 ml   Net    -55 ml        GI/Nutrition:  Orders Placed This Encounter   Procedures   • DIET ORDER     Standing Status: Standing      Number of Occurrences: 1      Standing Expiration Date:      Order Specific Question:  Diet:     Answer:  Full Liquid [11]     Order Specific Question:  Diet:     Answer:  Diabetic [3]     Medical Decision Making, by Problem:  Active Hospital Problems    Diagnosis   • *Acute Cholecystitis - seen on CT and HIDA, POD 2 s/p lap sara, WBC normalized  -IV morphine /norco PRN, minimize as able to prevent constipation   -  -Supportive care  -Continue IV cipro/ flagyl  -Full liquid diet  -Suppository/Enema/Aggressive bowel reg     • Chronic Neck pain - takes norco as outpatient but says this makes him nauseated  -Tylenol  -PRN morphine  -Trial of norco     • Hyponatremia [E87.1]  -From volume depletion  -IVF  -Repeat in AM     • Diabetes (HCC) [E11.9]- AM sugars improved w lantus increase.  -SSI  -Lantus 18U nightly     • Gastroesophageal reflux disease without esophagitis [K21.9]  -Pepcid     • Hyperlipidemia [E78.5]  -Crestor       Dispo: DC in 24-48 pending PO and BM    30 minutes were spent discussing course and plan with family at bedside, this was in addition to the time of the initial visit with the patient for total time of 55min.  All questions answered.  Greater than 50% of this time was at the patient's bedside.      Labs reviewed, Medications reviewed and Radiology images reviewed  Martin catheter: No Martin      DVT Prophylaxis: Enoxaparin (Lovenox)      Antibiotics: Treating active infection/contamination beyond 24 hours perioperative coverage

## 2017-01-08 NOTE — PROGRESS NOTES
Pt alert, oriented x 4, ambulating in room with help from family, gait steady. Reports 7/10 deep abdominal pain - acetaminophen given per request. Denies nausea. Abdominal lap sites covered and SANDRA removal site covered, dressings CDI. Abdominal bruising darker than yesterday, but otherwise unchanged. Pt refusing milk of mag and suppository this AM, bowel sounds active, had multiple bowel movements. Pt tolerating room air. IVF infusing per order. Family at bedside. Pt and family updated on plan, denies further needs at this time.

## 2017-01-08 NOTE — PROGRESS NOTES
Surgical Progress Note    Author: Dustin Ferreira Date & Time created: 2017   11:00 AM     Interval Events:  Passsing flatus, now having BMs.  Tolerating full liquid diet.  Pain well controlled.    Review of Systems   Respiratory: Negative.    Cardiovascular: Negative.    Gastrointestinal: Positive for abdominal pain. Negative for nausea and vomiting.     Hemodynamics:  Temp (24hrs), Av.5 °C (99.5 °F), Min:37.1 °C (98.8 °F), Max:37.8 °C (100 °F)  Temperature: 37.8 °C (100 °F)  Pulse  Av.6  Min: 70  Max: 116   Blood Pressure: 147/81 mmHg     Respiratory:    Respiration: 16, Pulse Oximetry: 90 %           Neuro:  GCS = 15        Fluids:    Intake/Output Summary (Last 24 hours) at 17 1100  Last data filed at 17 0600   Gross per 24 hour   Intake    550 ml   Output   1300 ml   Net   -750 ml        Current Diet Order   Procedures   • DIET ORDER     Physical Exam   Constitutional: He is oriented to person, place, and time. He appears well-developed and well-nourished. No distress.   HENT:   Head: Normocephalic.   Eyes: Pupils are equal, round, and reactive to light. No scleral icterus.   Cardiovascular: Normal rate.    Pulmonary/Chest: Effort normal and breath sounds normal. No respiratory distress.   Abdominal: Soft. Bowel sounds are normal. He exhibits no distension. There is tenderness (at incisions). There is no rebound and no guarding.   Extensive ecchymosis in anterior abdominal wall, unchanged   Neurological: He is alert and oriented to person, place, and time.   Psychiatric: He has a normal mood and affect. His behavior is normal. Judgment and thought content normal.     Labs:  Recent Results (from the past 24 hour(s))   ACCU-CHEK GLUCOSE    Collection Time: 17 11:28 AM   Result Value Ref Range    Glucose - Accu-Ck 211 (H) 65 - 99 mg/dL   ACCU-CHEK GLUCOSE    Collection Time: 17  4:54 PM   Result Value Ref Range    Glucose - Accu-Ck 136 (H) 65 - 99 mg/dL   ACCU-CHEK GLUCOSE     Collection Time: 01/07/17  9:09 PM   Result Value Ref Range    Glucose - Accu-Ck 193 (H) 65 - 99 mg/dL   COMP METABOLIC PANEL    Collection Time: 01/08/17  3:03 AM   Result Value Ref Range    Sodium 132 (L) 135 - 145 mmol/L    Potassium 3.5 (L) 3.6 - 5.5 mmol/L    Chloride 103 96 - 112 mmol/L    Co2 23 20 - 33 mmol/L    Anion Gap 6.0 0.0 - 11.9    Glucose 189 (H) 65 - 99 mg/dL    Bun 12 8 - 22 mg/dL    Creatinine 0.52 0.50 - 1.40 mg/dL    Calcium 7.8 (L) 8.5 - 10.5 mg/dL    AST(SGOT) 39 12 - 45 U/L    ALT(SGPT) 40 2 - 50 U/L    Alkaline Phosphatase 64 30 - 99 U/L    Total Bilirubin 0.8 0.1 - 1.5 mg/dL    Albumin 2.7 (L) 3.2 - 4.9 g/dL    Total Protein 5.1 (L) 6.0 - 8.2 g/dL    Globulin 2.4 1.9 - 3.5 g/dL    A-G Ratio 1.1 g/dL   CBC WITHOUT DIFFERENTIAL    Collection Time: 01/08/17  3:03 AM   Result Value Ref Range    WBC 8.4 4.8 - 10.8 K/uL    RBC 3.84 (L) 4.70 - 6.10 M/uL    Hemoglobin 11.6 (L) 14.0 - 18.0 g/dL    Hematocrit 33.0 (L) 42.0 - 52.0 %    MCV 85.9 81.4 - 97.8 fL    MCH 30.2 27.0 - 33.0 pg    MCHC 35.2 33.7 - 35.3 g/dL    RDW 41.7 35.9 - 50.0 fL    Platelet Count 105 (L) 164 - 446 K/uL    MPV 9.1 9.0 - 12.9 fL   ESTIMATED GFR    Collection Time: 01/08/17  3:03 AM   Result Value Ref Range    GFR If African American >60 >60 mL/min/1.73 m 2    GFR If Non African American >60 >60 mL/min/1.73 m 2   ACCU-CHEK GLUCOSE    Collection Time: 01/08/17  5:58 AM   Result Value Ref Range    Glucose - Accu-Ck 159 (H) 65 - 99 mg/dL     Medical Decision Making, by Problem:  Active Hospital Problems    Diagnosis   • Gallstones [K80.20]   • Leukocytosis [D72.829]   • Hyponatremia [E87.1]   • Diabetes (HCC) [E11.9]   • Abdominal pain [R10.9]   • Gastroesophageal reflux disease without esophagitis [K21.9]   • Hyperlipidemia [E78.5]     Plan:  I have advanced diet to low residue ADA diet.  Okay for discharge home from surgical standpoint.  Have written follow up orders for post op follow up/activity with me    Quality  Measures:  Labs reviewed  Martin catheter: No Martin                    Discussed patient condition with Family and Patient

## 2017-01-08 NOTE — CARE PLAN
Problem: Communication  Goal: The ability to communicate needs accurately and effectively will improve  Outcome: PROGRESSING AS EXPECTED  Plan of care discussed. Expectations and limitations set during initial encounter.  Allowed pt to ask  Questions/ clarifications during discussion of POC.     Problem: Safety  Goal: Will remain free from injury  Outcome: PROGRESSING AS EXPECTED  Hourly rounds done. Call light within reach. Needs attended on a timely manner. Threaded socks on when OOB. Educated on the importance of calling for assistance when attempting to be OOB.  Refused bed alarm. Calls appropriately.     Problem: Infection  Goal: Will remain free from infection  Outcome: PROGRESSING AS EXPECTED  Hand hygiene advocated. Educated on measures on how to prevent infection.  Abd Lap site Dressing kept CDI. Receives Cipro and Flagyl  For IV ABx coverage.    Problem: Pain Management  Goal: Pain level will decrease to patient’s comfort goal  Outcome: PROGRESSING AS EXPECTED  [place] PO PRN for pain relief. SEE MAR.

## 2017-01-08 NOTE — DISCHARGE INSTRUCTIONS
Do not lift or pull anything weighing greater than 15 pounds for the next four weeks.  You may remove the bandages when you get home.  You may shower, but may not soak in tubs, hot tubs, or swim.  You may take an over-the-counter laxative for constipation as needed.  Call the surgeon's office for any signs of infection such as drainage for the incisions, fevers, chills, or redness. Notify office if any incisions open up and for pain not controlled with medication. For any emergency, attend your local ED.  Take medications as prescribed.    Discharge Instructions    Discharged to home by car with relative. Discharged via wheelchair, hospital escort: Yes.  Special equipment needed: Not Applicable    Be sure to schedule a follow-up appointment with your primary care doctor or any specialists as instructed.     Discharge Plan:   Diet Plan: Discussed  Activity Level: Discussed  Confirmed Follow up Appointment: Patient to Call and Schedule Appointment  Confirmed Symptoms Management: Discussed  Medication Reconciliation Updated: Yes  Influenza Vaccine Indication: Not indicated: Previously immunized this influenza season and > 8 years of age  Influenza Vaccine Given - only chart on this line when given: Influenza Vaccine Given (See MAR)    I understand that a diet low in cholesterol, fat, and sodium is recommended for good health. Unless I have been given specific instructions below for another diet, I accept this instruction as my diet prescription.   Other diet: Diabetic, low fiber.    Special Instructions: None    · Is patient discharged on Warfarin / Coumadin?   No     · Is patient Post Blood Transfusion?  No    Depression / Suicide Risk    As you are discharged from this [facility] facility, it is important to learn how to keep safe from harming yourself.    Recognize the warning signs:  · Abrupt changes in personality, positive or negative- including increase in energy   · Giving away possessions  · Change in  eating patterns- significant weight changes-  positive or negative  · Change in sleeping patterns- unable to sleep or sleeping all the time   · Unwillingness or inability to communicate  · Depression  · Unusual sadness, discouragement and loneliness  · Talk of wanting to die  · Neglect of personal appearance   · Rebelliousness- reckless behavior  · Withdrawal from people/activities they love  · Confusion- inability to concentrate     If you or a loved one observes any of these behaviors or has concerns about self-harm, here's what you can do:  · Talk about it- your feelings and reasons for harming yourself  · Remove any means that you might use to hurt yourself (examples: pills, rope, extension cords, firearm)  · Get professional help from the community (Mental Health, Substance Abuse, psychological counseling)  · Do not be alone:Call your Safe Contact- someone whom you trust who will be there for you.  · Call your local CRISIS HOTLINE 945-9604 or 576-244-2673  · Call your local Children's Mobile Crisis Response Team [place] (051) 725-7150 or www.oragenics  · Call the toll free National Suicide Prevention Hotlines   · National Suicide Prevention Lifeline 594-787-QHYF (8349)  · National Hope Line Network 800-SUICIDE (187-3334)

## 2017-01-08 NOTE — PROGRESS NOTES
Assumed care for pt at 1900 after report from Day RN. Pt. AA/Ox4, mostly Indian speaking. VSS. HRR. CMS+. On 2 L of O2 via NC, on , trial weaning off oxygen started. Encouraged IS, able to pull 1500 during execution. Dressing to abdominal lap stab sites kept CDI. Abdominal bruising noted. BS+ Last BM 1/7/16. Up self with steady gait. SCDs on. Declines pain meds at this time. PIV to Rt and Lt Ac infusing.  Receives ciprofloxacin and Flagyl IV for Abx coverage.     Call light within reach. Needs attended on a timely manner. Refused bed alarm. Calls appropriately.

## 2017-01-13 ENCOUNTER — OFFICE VISIT (OUTPATIENT)
Dept: MEDICAL GROUP | Facility: MEDICAL CENTER | Age: 67
End: 2017-01-13
Attending: FAMILY MEDICINE
Payer: MEDICAID

## 2017-01-13 VITALS
HEART RATE: 64 BPM | DIASTOLIC BLOOD PRESSURE: 80 MMHG | OXYGEN SATURATION: 96 % | RESPIRATION RATE: 16 BRPM | WEIGHT: 147 LBS | SYSTOLIC BLOOD PRESSURE: 142 MMHG | BODY MASS INDEX: 23.63 KG/M2 | TEMPERATURE: 96.9 F | HEIGHT: 66 IN

## 2017-01-13 DIAGNOSIS — K21.9 GASTROESOPHAGEAL REFLUX DISEASE WITHOUT ESOPHAGITIS: ICD-10-CM

## 2017-01-13 DIAGNOSIS — Z79.4 UNCONTROLLED TYPE 2 DIABETES MELLITUS WITH HYPERGLYCEMIA, WITH LONG-TERM CURRENT USE OF INSULIN (HCC): ICD-10-CM

## 2017-01-13 DIAGNOSIS — G89.29 CHRONIC NECK PAIN: ICD-10-CM

## 2017-01-13 DIAGNOSIS — M54.2 CHRONIC NECK PAIN: ICD-10-CM

## 2017-01-13 DIAGNOSIS — E11.65 UNCONTROLLED TYPE 2 DIABETES MELLITUS WITH HYPERGLYCEMIA, WITH LONG-TERM CURRENT USE OF INSULIN (HCC): ICD-10-CM

## 2017-01-13 DIAGNOSIS — R10.13 EPIGASTRIC PAIN: ICD-10-CM

## 2017-01-13 LAB
APPEARANCE UR: CLEAR
BILIRUB UR STRIP-MCNC: NORMAL MG/DL
COLOR UR AUTO: YELLOW
GLUCOSE UR STRIP.AUTO-MCNC: 100 MG/DL
KETONES UR STRIP.AUTO-MCNC: NORMAL MG/DL
LEUKOCYTE ESTERASE UR QL STRIP.AUTO: NORMAL
NITRITE UR QL STRIP.AUTO: NORMAL
PH UR STRIP.AUTO: 6.5 [PH] (ref 5–8)
PROT UR QL STRIP: NORMAL MG/DL
RBC UR QL AUTO: NORMAL
SP GR UR STRIP.AUTO: 1.01
UROBILINOGEN UR STRIP-MCNC: NORMAL MG/DL

## 2017-01-13 PROCEDURE — 99214 OFFICE O/P EST MOD 30 MIN: CPT | Performed by: FAMILY MEDICINE

## 2017-01-13 PROCEDURE — 99213 OFFICE O/P EST LOW 20 MIN: CPT | Performed by: FAMILY MEDICINE

## 2017-01-13 PROCEDURE — 81002 URINALYSIS NONAUTO W/O SCOPE: CPT | Performed by: FAMILY MEDICINE

## 2017-01-13 RX ORDER — HYDROCODONE BITARTRATE AND ACETAMINOPHEN 10; 325 MG/1; MG/1
1 TABLET ORAL EVERY 8 HOURS PRN
Qty: 30 TAB | Refills: 0 | Status: SHIPPED | OUTPATIENT
Start: 2017-01-13 | End: 2017-01-17 | Stop reason: SDUPTHER

## 2017-01-13 RX ORDER — METOCLOPRAMIDE 5 MG/1
5 TABLET ORAL
Qty: 30 TAB | Refills: 0 | Status: SHIPPED | OUTPATIENT
Start: 2017-01-13 | End: 2017-01-24

## 2017-01-13 RX ORDER — ESOMEPRAZOLE MAGNESIUM 20 MG/1
20 TABLET, DELAYED RELEASE ORAL DAILY
Qty: 30 TAB | Refills: 6 | Status: SHIPPED | OUTPATIENT
Start: 2017-01-13 | End: 2017-02-13

## 2017-01-13 NOTE — MR AVS SNAPSHOT
"        Yonis De La Cruz   2017 3:10 PM   Office Visit   MRN: [id number]    [facility]   Dept Phone:  [phone]    Description:  Male : 1950   Provider:  Ronald Chaves M.D.           Allergies as of 2017     Allergen Noted Reactions    Aspirin 2015       abd pain    Bloodless 2017         You were diagnosed with     Chronic neck pain   [156339]       Gastroesophageal reflux disease without esophagitis   [454852]       Epigastric pain   [217242]         Vital Signs     Blood Pressure Pulse Temperature Respirations Height Weight    142/80 mmHg 64 36.1 °C (96.9 °F) 16 1.676 m (5' 5.98\") 66.679 kg (147 lb)    Body Mass Index Oxygen Saturation Smoking Status             23.74 kg/m2 96% Never Smoker          Basic Information     Date Of Birth Sex Race Ethnicity Preferred Language    1950 Male  or   Origin ([place]ian,Singaporean,[place], etc) English      Your appointments     2017  3:10 PM   Established Patient with Ronald Chaves M.D.   [facility]    [address] [place] [id number]   [phone]           You will be receiving a confirmation call a few days before your appointment from our automated call confirmation system.              Problem List              ICD-10-CM Priority Class Noted - Resolved    Chronic neck pain M54.2, G89.29   2015 - Present    Hyperlipidemia E78.5   Unknown - Present    Gastroesophageal reflux disease without esophagitis K21.9   7/10/2015 - Present    Tinnitus H93.19   7/10/2015 - Present    Uncontrolled type 2 diabetes mellitus with hyperglycemia, with long-term current use of insulin (HCC) E11.65, Z79.4   2016 - Present    Diabetes (CMS-HCC) E11.9   2017 - Present    Abdominal pain R10.9   2017 - Present    Gallstones K80.20   2017 - Present    Leukocytosis D72.829   2017 - Present    Hyponatremia E87.1   2017 - Present      "   Health Maintenance        Date Due Completion Dates    DIABETES MONOFILAMTENT / LE EXAM 4/14/1951 ---    IMM DTaP/Tdap/Td Vaccine (1 - Tdap) 10/14/1971 ---    COLONOSCOPY 10/14/2002 ---    IMM ZOSTER VACCINE 10/14/2012 ---    RETINAL SCREENING 6/9/2016 6/9/2015 (Done)    Override on 6/9/2015: Done    A1C SCREENING 7/4/2017 1/4/2017, 11/16/2016, 8/23/2016, 4/15/2016, 11/9/2015, 7/8/2015, 3/11/2015    URINE ACR / MICROALBUMIN 11/16/2017 11/16/2016, 11/9/2015, 3/11/2015    FASTING LIPID PROFILE 1/5/2018 1/5/2017, 11/16/2016, 11/9/2015, 3/11/2015    SERUM CREATININE 1/8/2018 1/8/2017, 1/7/2017, 1/6/2017, 1/5/2017, 1/4/2017, 11/9/2015, 3/11/2015            Current Immunizations     Influenza Vaccine Quad Inj (Pf) 10/13/2016    Pneumococcal polysaccharide vaccine (PPSV-23) 11/21/2016  9:23 AM      Below and/or attached are the medications your provider expects you to take. Review all of your home medications and newly ordered medications with your provider and/or pharmacist. Follow medication instructions as directed by your provider and/or pharmacist. Please keep your medication list with you and share with your provider. Update the information when medications are discontinued, doses are changed, or new medications (including over-the-counter products) are added; and carry medication information at all times in the event of emergency situations     Allergies:  ASPIRIN - (reactions not documented)     BLOODLESS - (reactions not documented)               Medications  Valid as of: January 13, 2017 -  4:30 PM    Generic Name Brand Name Tablet Size Instructions for use    Acetaminophen (Tab) TYLENOL 325 MG Take 2 Tabs by mouth every 6 hours as needed for Mild Pain.        Calcium   Take 1 Tab by mouth 3 times a day.        Ciprofloxacin HCl (Tab) CIPRO 500 MG Take 1 Tab by mouth 2 times a day.        Esomeprazole Magnesium (Tablet Delayed Response) Esomeprazole Magnesium 20 MG Take 20 mg by mouth every day.         Hydrocodone-Acetaminophen (Tab) NORCO  MG Take 1 Tab by mouth every 8 hours as needed.        Insulin Glargine (Solution) LANTUS 100 UNIT/ML INJECT 20 UNITS SUBCUTANEOUSLY AS INSTRUCTED TWICE DAILY        Lisinopril (Tab) PRINIVIL 5 MG Take 1 Tab by mouth every day.        Magnesium Hydroxide (Suspension) MILK OF MAGNESIA 400 MG/5ML Take 30 mL by mouth 4 times a day as needed (constipation).        MetFORMIN HCl (Tab) GLUCOPHAGE 1000 MG TAKE ONE TABLET BY MOUTH TWICE DAILY WITH FOOD        Metoclopramide HCl (Tab) REGLAN 5 MG Take 1 Tab by mouth 3 times a day before meals.        MetroNIDAZOLE (Tab) FLAGYL 500 MG Take 1 Tab by mouth every 8 hours.        Polyethylene Glycol 3350 (Pack) MIRALAX  Take  by mouth every day.        RaNITidine HCl (Tab) ZANTAC 300 MG Take 1 Tab by mouth every day.        Rosuvastatin Calcium (Tab) CRESTOR 40 MG TAKE ONE TABLET BY MOUTH ONCE DAILY        .                 Medicines prescribed today were sent to:     [facility] [address] [place], [place] [address] [address] [place], [place] [address] [address] [place] [address]    Phone: [phone] Fax: [phone]    Open 24 Hours?: No      Medication refill instructions:       If your prescription bottle indicates you have medication refills left, it is not necessary to call your provider’s office. Please contact your pharmacy and they will refill your medication.    If your prescription bottle indicates you do not have any refills left, you may request refills at any time through one of the following ways: The online [employer] system (except Urgent Care), by calling your provider’s office, or by asking your pharmacy to contact your provider’s office with a refill request. Medication refills are processed only during regular business hours and may not be available until the next business day. Your provider may request additional information or to have a follow-up visit with you prior to refilling your medication.   *Please Note: Medication refills are assigned a  new Rx number when refilled electronically. Your pharmacy may indicate that no refills were authorized even though a new prescription for the same medication is available at the pharmacy. Please request the medicine by name with the pharmacy before contacting your provider for a refill.        Other Notes About Your Plan     Fall risk done 07/10/2015           MyChart Status: Patient Declined

## 2017-01-14 NOTE — ASSESSMENT & PLAN NOTE
Patient has continued intermittent chronic neck pain which he has continued to manage with intermittent dosing of Norco 10/325. Typically a prescription will last him several months and he will go normally multiple days without taking any medication. Symptoms are typically posterior neck pain without radiation down into either arm. Also denies persistent arm numbness.

## 2017-01-14 NOTE — ASSESSMENT & PLAN NOTE
Patient reports has been compliant taking [id number] mg. He reports that this has been moderately successful in helping him avoid substernal burning. He denies any black or bloody stools.

## 2017-01-14 NOTE — ASSESSMENT & PLAN NOTE
Patient reports today concerned about persistent mid abdominal and epigastric pains that have persisted since most recent onset last Tuesday 1/3/17. Due to the intensity of pain patient presented to [facility] emergency room that evening. Evaluation with CT scan of the abdomen was notable for several large gallstones and some thickening of the gallbladder wall. Due to patient's atypical location relative to the usual cholecystitis pain his general surgeon, Dr. Mark gray, obtained a hiatus scan which showed 0 tracer uptake into the gallbladder. At this time patient was taken to surgery and his gallbladder was removed laparoscopically on 1/5/17. Patient reports he continues to have epigastric pain along with frequent eructation. Reports he has been having normal slightly soft stools without blood since Sunday 1/8/17.femalephysicaltemplate he reports this type of pain has come and gone in the past up to 4 times per month usually subsiding within 6-8 hours. Intensity of the pain has been building over the past 2-3 years. Patient reports normally with these episodes of pain as soon as he has a bowel movement the pain will immediately resolve. That has not been the case at this time since patient has continued to have pain despite recent daily stools. He has not had any emesis. Has not had any recent fever. His daughter is concerned that the wart gangrene was used in describing his gallbladder and she is concerned whether he could have a infection. Patient is taking Norco 10 mg 3 times daily for pain management and is taking milk of magnesia every 1-2 days to avoid constipation. He denies dysuria. WBC on 1/7/17 had normalized.

## 2017-01-14 NOTE — ASSESSMENT & PLAN NOTE
Recent blood sugars have been elevated 150-to 200. Patient just restarted his metformin in the past 24 hours but has continued his usual Lantus injection. He has not expressed any low blood sugars recently characterized by headache or diaphoresis. A1c was just measured last week and was elevated at 8.5.

## 2017-01-14 NOTE — PROGRESS NOTES
No chief complaint on file.      HISTORY OF PRESENT ILLNESS: Patient is a 66 y.o. male established patient who presents today to follow-up on persistent epigastric pain postoperatively, chronic neck pain, type 2 diabetes mellitus, GERD        Chronic neck pain  Patient has continued intermittent chronic neck pain which he has continued to manage with intermittent dosing of Norco 10/325. Typically a prescription will last him several months and he will go normally multiple days without taking any medication. Symptoms are typically posterior neck pain without radiation down into either arm. Also denies persistent arm numbness.    Uncontrolled type 2 diabetes mellitus with hyperglycemia, with long-term current use of insulin (HCC)  Recent blood sugars have been elevated 150-to 200. Patient just restarted his metformin in the past 24 hours but has continued his usual Lantus injection. He has not expressed any low blood sugars recently characterized by headache or diaphoresis. A1c was just measured last week and was elevated at 8.5.    Gastroesophageal reflux disease without esophagitis  Patient reports has been compliant taking [id number] mg. He reports that this has been moderately successful in helping him avoid substernal burning. He denies any black or bloody stools.    Abdominal pain  Patient reports today concerned about persistent mid abdominal and epigastric pains that have persisted since most recent onset last Tuesday 1/3/17. Due to the intensity of pain patient presented to [facility] emergency room that evening. Evaluation with CT scan of the abdomen was notable for several large gallstones and some thickening of the gallbladder wall. Due to patient's atypical location relative to the usual cholecystitis pain his general surgeon, Dr. Mark gary, obtained a hiatus scan which showed 0 tracer uptake into the gallbladder. At this time patient was taken to surgery and his gallbladder was removed laparoscopically on  1/5/17. Patient reports he continues to have epigastric pain along with frequent eructation. Reports he has been having normal slightly soft stools without blood since Sunday 1/8/17.femalephysicaltemplate he reports this type of pain has come and gone in the past up to 4 times per month usually subsiding within 6-8 hours. Intensity of the pain has been building over the past 2-3 years. Patient reports normally with these episodes of pain as soon as he has a bowel movement the pain will immediately resolve. That has not been the case at this time since patient has continued to have pain despite recent daily stools. He has not had any emesis. Has not had any recent fever. His daughter is concerned that the wart gangrene was used in describing his gallbladder and she is concerned whether he could have a infection. Patient is taking Norco 10 mg 3 times daily for pain management and is taking milk of magnesia every 1-2 days to avoid constipation. He denies dysuria. WBC on 1/7/17 had normalized.  Additional note is made the patient has been on metronidazole 500 mg along with Cipro 500 mg since his hospital discharge. Those medications will be finished tomorrow.    Patient Active Problem List    Diagnosis Date Noted   • Gallstones 01/05/2017   • Leukocytosis 01/05/2017   • Hyponatremia 01/05/2017   • Diabetes (CMS-[facility]) 01/04/2017   • Abdominal pain 01/04/2017   • Uncontrolled type 2 diabetes mellitus with hyperglycemia, with long-term current use of insulin ([facility]) 11/21/2016   • Gastroesophageal reflux disease without esophagitis 07/10/2015   • Tinnitus 07/10/2015   • Chronic neck pain 02/27/2015   • Hyperlipidemia        Allergies:Aspirin and Bloodless    Current Outpatient Prescriptions   Medication Sig Dispense Refill   • metoclopramide (REGLAN) 5 MG tablet Take 1 Tab by mouth 3 times a day before meals. 30 Tab 0   • Esomeprazole Magnesium 20 MG Tablet Delayed Response Take 20 mg by mouth every day. 30 Tab 6   •  "hydrocodone/acetaminophen (NORCO)  MG Tab Take 1 Tab by mouth every 8 hours as needed. 30 Tab 0   • acetaminophen (TYLENOL) 325 MG Tab Take 2 Tabs by mouth every 6 hours as needed for Mild Pain. 30 Tab 0   • magnesium hydroxide (MILK OF MAGNESIA) 400 MG/5ML Suspension Take 30 mL by mouth 4 times a day as needed (constipation).     • polyethylene glycol/lytes (MIRALAX) Pack Take  by mouth every day.  3   • ciprofloxacin (CIPRO) 500 MG Tab Take 1 Tab by mouth 2 times a day. 12 Tab 0   • metronidazole (FLAGYL) 500 MG Tab Take 1 Tab by mouth every 8 hours. 18 Tab 0   • CALCIUM PO Take 1 Tab by mouth 3 times a day.     • metformin (GLUCOPHAGE) 1000 MG tablet TAKE ONE TABLET BY MOUTH TWICE DAILY WITH FOOD 60 Tab 3   • CRESTOR 40 MG tablet TAKE ONE TABLET BY MOUTH ONCE DAILY 30 Tab 3   • LANTUS 100 UNIT/ML Solution INJECT 20 UNITS SUBCUTANEOUSLY AS INSTRUCTED TWICE DAILY 20 mL 6   • lisinopril (PRINIVIL) 5 MG Tab Take 1 Tab by mouth every day. 30 Tab 11   • ranitidine (ZANTAC) 300 MG tablet Take 1 Tab by mouth every day. 60 Tab 3     No current facility-administered medications for this visit.       Social History   Substance Use Topics   • Smoking status: Never Smoker    • Smokeless tobacco: Never Used   • Alcohol Use: No      Comment: quit age 27       Family History   Problem Relation Age of Onset   • Diabetes Mother    • Cancer Neg Hx    • Heart Disease Neg Hx    • Stroke Neg Hx        ROS:  Review of Systems   Constitutional: Negative for fever, chills, weight loss and malaise/fatigue.   Eyes: Negative for blurred vision.   Respiratory: Negative for cough, sputum production, shortness of breath and wheezing.    Cardiovascular: Negative for chest pain, palpitations, orthopnea and leg swelling.    Endo/Heme/Allergies: Does not bruise/bleed easily.               Exam:  Blood pressure 142/80, pulse 64, temperature 36.1 °C (96.9 °F), resp. rate 16, height 1.676 m (5' 5.98\"), weight 66.679 kg (147 lb), SpO2 96 " %.  General:  Well nourished, well developed male in NAD  Head is grossly normal.  Neck: Supple without JVD or bruit. Thyroid is not enlarged. Trachea is midline.  Pulmonary: Clear to ausculation .  Normal effort. No rales, ronchi, or wheezing.  Cardiovascular: Regular rate and rhythm without murmur.  Abdomen-Abdomen is soft, normal bowel sounds, no masses. Moderately extensive purple bruising is noted over the epigastric and upper abdominal area with no warmth. 1-2+ tender to palpation just to the right of the umbilicus with minimal tenderness in the more lateral right upper quadrant. No rebound tenderness.  Lower extremities-notable for 1+ foot and ankle edema bilaterally without redness or tenderness.  UA-negative WBCs, nitrates, trace RBCs, specific gravity 1.010, pH 6.5, glucose 1+  Results for ERICKA JONES (MRN [id number]) as of 1/13/2017 17:07   Ref. Range 1/8/2017 03:03   WBC Latest Ref Range: 4.8-10.8 K/uL 8.4   RBC Latest Ref Range: 4.70-6.10 M/uL 3.84 (L)   Hemoglobin Latest Ref Range: 14.0-18.0 g/dL 11.6 (L)   Hematocrit Latest Ref Range: 42.0-52.0 % 33.0 (L)   MCV Latest Ref Range: 81.4-97.8 fL 85.9   MCH Latest Ref Range: 27.0-33.0 pg 30.2   MCHC Latest Ref Range: 33.7-35.3 g/dL 35.2   RDW Latest Ref Range: 35.9-50.0 fL 41.7   Platelet Count Latest Ref Range: 164-446 K/uL 105 (L)   MPV Latest Ref Range: 9.0-12.9 fL 9.1         Please note that this dictation was created using voice recognition software. I have made every reasonable attempt to correct obvious errors, but I expect that there are errors of grammar and possibly content that I did not discover before finalizing the note.    Assessment/Plan:  1. Chronic neck pain  hydrocodone/acetaminophen (NORCO)  MG Tab   2. Gastroesophageal reflux disease without esophagitis     3. Epigastric pain     4. Uncontrolled type 2 diabetes mellitus with hyperglycemia, with long-term current use of insulin (HCC)        Plan: 1. Replace ranitidine with Nexium  24-hour OTC 20 mg once daily  2. Cautious trial of Reglan 5 mg before each meal  3. Observe-patient may improve simply by completing his course of Flagyl and Cipro  4. Daughter understands to present to her father to the hospital if he starts running temperatures consistently above 100° with increased abdominal pain suggestive of infection  5. General surgery follow-up in 1 week, follow up with me in 10-12 days

## 2017-01-15 NOTE — DISCHARGE SUMMARY
CHIEF COMPLAINT ON ADMISSION  Chief Complaint   Patient presents with   • Abdominal Pain   • N/V       CODE STATUS  Full code  HPI & HOSPITAL COURSE  This is a 66 y.o. year old male here with cholecystitis s/p lap choley, he had some dificulty with post op pain and mobility and family was very anxious about d/c to home, patient was tolerating diet and ambulating on unit unassisted.    Therefore, he is discharged in good and stable condition with close outpatient follow-up.    SPECIFIC OUTPATIENT FOLLOW-UP  Jhon    DISCHARGE PROBLEM LIST  Principal Problem:    Gallstones POA: Unknown  Active Problems:    Hyperlipidemia POA: Yes    Gastroesophageal reflux disease without esophagitis POA: Yes    Diabetes (CMS-HCC) POA: Unknown    Abdominal pain      Overview: January 2017    Leukocytosis POA: Unknown    Hyponatremia POA: Unknown  Resolved Problems:    * No resolved hospital problems. *      FOLLOW UP  Future Appointments  Date Time Provider Department Center   1/24/2017 3:10 PM Ronald Chaves M.D. [facility]     Dustin Ferreira M.D.  [address]  [address]  [place] [address] [phone]    Schedule an appointment as soon as possible for a visit in 10 days      Ronald Chaves M.D.  [address]  [address]  [place] [address] [phone]            MEDICATIONS ON DISCHARGE   Yonis De La Cruz   Home Medication Instructions [place]:[id number]    Printed on:01/14/17 2124   Medication Information                      acetaminophen (TYLENOL) 325 MG Tab  Take 2 Tabs by mouth every 6 hours as needed for Mild Pain.             CALCIUM PO  Take 1 Tab by mouth 3 times a day.             ciprofloxacin (CIPRO) 500 MG Tab  Take 1 Tab by mouth 2 times a day.             CRESTOR 40 MG tablet  TAKE ONE TABLET BY MOUTH ONCE DAILY             LANTUS 100 UNIT/ML Solution  INJECT 20 UNITS SUBCUTANEOUSLY AS INSTRUCTED TWICE DAILY             lisinopril (PRINIVIL) 5 MG Tab  Take 1 Tab by mouth every day.             magnesium  hydroxide (MILK OF MAGNESIA) 400 MG/5ML Suspension  Take 30 mL by mouth 4 times a day as needed (constipation).             metformin (GLUCOPHAGE) 1000 MG tablet  TAKE ONE TABLET BY MOUTH TWICE DAILY WITH FOOD             metronidazole (FLAGYL) 500 MG Tab  Take 1 Tab by mouth every 8 hours.             polyethylene glycol/lytes (MIRALAX) Pack  Take  by mouth every day.             ranitidine (ZANTAC) 300 MG tablet  Take 1 Tab by mouth every day.                 DIET  No orders of the defined types were placed in this encounter.       ACTIVITY  Light duty.  No heavy lifting    CONSULTATIONS  [facility]     PROCEDURES  [facility] aura 1/5    LABORATORY  Lab Results   Component Value Date/Time    SODIUM 132* 01/08/2017 03:03 AM    POTASSIUM 3.5* 01/08/2017 03:03 AM    CHLORIDE 103 01/08/2017 03:03 AM    CO2 23 01/08/2017 03:03 AM    GLUCOSE 189* 01/08/2017 03:03 AM    BUN 12 01/08/2017 03:03 AM    CREATININE 0.52 01/08/2017 03:03 AM        Lab Results   Component Value Date/Time    WBC 8.4 01/08/2017 03:03 AM    HEMOGLOBIN 11.6* 01/08/2017 03:03 AM    HEMATOCRIT 33.0* 01/08/2017 03:03 AM    PLATELET COUNT 105* 01/08/2017 03:03 AM        Total time of the discharge process exceeds 32 minutes.

## 2017-01-16 ENCOUNTER — TELEPHONE (OUTPATIENT)
Dept: MEDICAL GROUP | Facility: MEDICAL CENTER | Age: 67
End: 2017-01-16

## 2017-01-17 DIAGNOSIS — G89.29 CHRONIC NECK PAIN: ICD-10-CM

## 2017-01-17 DIAGNOSIS — M54.2 CHRONIC NECK PAIN: ICD-10-CM

## 2017-01-17 RX ORDER — HYDROCODONE BITARTRATE AND ACETAMINOPHEN 10; 325 MG/1; MG/1
1 TABLET ORAL EVERY 8 HOURS PRN
Qty: 30 TAB | Refills: 0 | Status: SHIPPED | OUTPATIENT
Start: 2017-01-17 | End: 2017-05-08 | Stop reason: SDUPTHER

## 2017-01-17 NOTE — TELEPHONE ENCOUNTER
Rx accidentally faxed, need hard copy      Was the patient seen in the last year in this department? Yes     Does patient have an active prescription for medications requested? No     Received Request Via: Patient

## 2017-01-24 ENCOUNTER — OFFICE VISIT (OUTPATIENT)
Dept: MEDICAL GROUP | Facility: MEDICAL CENTER | Age: 67
End: 2017-01-24
Attending: FAMILY MEDICINE
Payer: MEDICAID

## 2017-01-24 VITALS
HEIGHT: 66 IN | OXYGEN SATURATION: 96 % | BODY MASS INDEX: 22.82 KG/M2 | SYSTOLIC BLOOD PRESSURE: 122 MMHG | WEIGHT: 142 LBS | HEART RATE: 92 BPM | DIASTOLIC BLOOD PRESSURE: 62 MMHG | RESPIRATION RATE: 16 BRPM | TEMPERATURE: 97.5 F

## 2017-01-24 DIAGNOSIS — R10.11 RIGHT UPPER QUADRANT ABDOMINAL PAIN: ICD-10-CM

## 2017-01-24 DIAGNOSIS — F41.9 ANXIETY: ICD-10-CM

## 2017-01-24 DIAGNOSIS — M54.2 CHRONIC NECK PAIN: ICD-10-CM

## 2017-01-24 DIAGNOSIS — G89.29 CHRONIC NECK PAIN: ICD-10-CM

## 2017-01-24 PROBLEM — K80.20 GALLSTONES: Status: RESOLVED | Noted: 2017-01-05 | Resolved: 2017-01-24

## 2017-01-24 PROCEDURE — 99214 OFFICE O/P EST MOD 30 MIN: CPT | Performed by: FAMILY MEDICINE

## 2017-01-24 PROCEDURE — 99213 OFFICE O/P EST LOW 20 MIN: CPT | Performed by: FAMILY MEDICINE

## 2017-01-24 RX ORDER — BUSPIRONE HYDROCHLORIDE 5 MG/1
5 TABLET ORAL 3 TIMES DAILY
Qty: 90 TAB | Refills: 3 | Status: SHIPPED | OUTPATIENT
Start: 2017-01-24

## 2017-01-24 NOTE — ASSESSMENT & PLAN NOTE
Patient reports moderate improvement in the level of epigastric right upper quadrant pain. He is 3 weeks status post left per scopic cholecystectomy. Reporting no current burping, emesis. Reports normal appetite with resumption of normal formed stools. It does not sound like he picked up the metoclopramide but he did start on Nexium in place of ranitidine. He also completed his course of Flagyl and Cipro which may be also responsible for his decreased symptoms.

## 2017-01-24 NOTE — ASSESSMENT & PLAN NOTE
Patient reports continued neck pain which is severe at times vertically if he is laying down. He reports he was sitting up and there is no pressure on his neck he actually does not have any significant pain. Is not reporting any radiation of pain down into either upper extremity and no upper extremity numbness. Patient reports that he rarely takes a Norco 10 mg tablet to cope with the more severe episodes of neck pain. Norco tends to constantly constipated which she also is trying to avoid. MRI of the cervical spine done in 2014 was notable for mild to moderate bilateral neural foraminal stenosis at 3 levels near the base of his neck. Patient has never taken any NSAIDs.

## 2017-01-24 NOTE — ASSESSMENT & PLAN NOTE
Patient reports difficulty falling asleep. On questioning it sounds like he actually has fairly intense nocturnal anxiety reporting that he has many problems. He denies tearfulness or suicidal ideation.

## 2017-01-24 NOTE — PROGRESS NOTES
Chief Complaint   Patient presents with   • Diabetes Mellitus       HISTORY OF PRESENT ILLNESS: Patient is a 66 y.o. male established patient who presents today to follow-up on abdominal pain, chronic neck pain, anxiety        Abdominal pain  Patient reports moderate improvement in the level of epigastric right upper quadrant pain. He is 3 weeks status post left per scopic cholecystectomy. Reporting no current burping, emesis. Reports normal appetite with resumption of normal formed stools. It does not sound like he picked up the metoclopramide but he did start on Nexium in place of ranitidine. He also completed his course of Flagyl and Cipro which may be also responsible for his decreased symptoms.    Chronic neck pain  Patient reports continued neck pain which is severe at times vertically if he is laying down. He reports he was sitting up and there is no pressure on his neck he actually does not have any significant pain. Is not reporting any radiation of pain down into either upper extremity and no upper extremity numbness. Patient reports that he rarely takes a Norco 10 mg tablet to cope with the more severe episodes of neck pain. Norco tends to constantly constipated which she also is trying to avoid. MRI of the cervical spine done in 2014 was notable for mild to moderate bilateral neural foraminal stenosis at 3 levels near the base of his neck. Patient has never taken any NSAIDs.    Anxiety  Patient reports difficulty falling asleep. On questioning it sounds like he actually has fairly intense nocturnal anxiety reporting that he has many problems. He denies tearfulness or suicidal ideation.      Patient Active Problem List    Diagnosis Date Noted   • Anxiety 01/24/2017   • Leukocytosis 01/05/2017   • Hyponatremia 01/05/2017   • Diabetes (CMS-HCC) 01/04/2017   • Abdominal pain 01/04/2017   • Uncontrolled type 2 diabetes mellitus with hyperglycemia, with long-term current use of insulin (CMS-HCC) 11/21/2016   •  Gastroesophageal reflux disease without esophagitis 07/10/2015   • Tinnitus 07/10/2015   • Chronic neck pain 02/27/2015   • Hyperlipidemia        Allergies:Aspirin and Bloodless    Current Outpatient Prescriptions   Medication Sig Dispense Refill   • busPIRone (BUSPAR) 5 MG Tab Take 1 Tab by mouth 3 times a day. 90 Tab 3   • hydrocodone/acetaminophen (NORCO)  MG Tab Take 1 Tab by mouth every 8 hours as needed. 30 Tab 0   • esomeprazole (NEXIUM 24HR) 20 MG capsule Take 1 Cap by mouth every morning before breakfast. 30 Cap 5   • Esomeprazole Magnesium 20 MG Tablet Delayed Response Take 20 mg by mouth every day. 30 Tab 6   • acetaminophen (TYLENOL) 325 MG Tab Take 2 Tabs by mouth every 6 hours as needed for Mild Pain. 30 Tab 0   • magnesium hydroxide (MILK OF MAGNESIA) 400 MG/5ML Suspension Take 30 mL by mouth 4 times a day as needed (constipation).     • polyethylene glycol/lytes (MIRALAX) Pack Take  by mouth every day.  3   • CALCIUM PO Take 1 Tab by mouth 3 times a day.     • metformin (GLUCOPHAGE) 1000 MG tablet TAKE ONE TABLET BY MOUTH TWICE DAILY WITH FOOD 60 Tab 3   • CRESTOR 40 MG tablet TAKE ONE TABLET BY MOUTH ONCE DAILY 30 Tab 3   • LANTUS 100 UNIT/ML Solution INJECT 20 UNITS SUBCUTANEOUSLY AS INSTRUCTED TWICE DAILY 20 mL 6   • lisinopril (PRINIVIL) 5 MG Tab Take 1 Tab by mouth every day. 30 Tab 11   • ranitidine (ZANTAC) 300 MG tablet Take 1 Tab by mouth every day. 60 Tab 3     No current facility-administered medications for this visit.       Social History   Substance Use Topics   • Smoking status: Never Smoker    • Smokeless tobacco: Never Used   • Alcohol Use: No      Comment: quit age 27       Family History   Problem Relation Age of Onset   • Diabetes Mother    • Cancer Neg Hx    • Heart Disease Neg Hx    • Stroke Neg Hx        ROS:  Review of Systems   Constitutional: Negative for fever, chills, weight loss and malaise/fatigue.   Eyes: Negative for blurred vision.   Respiratory: Negative for  "cough, sputum production, shortness of breath and wheezing.    Cardiovascular: Negative for chest pain, palpitations, orthopnea and leg swelling.   Gastrointestinal: Negative for heartburn, nausea, vomiting and abdominal pain.   Musculoskeletal: Negative for myalgias, back pain and joint pain.   Endo/Heme/Allergies: Does not bruise/bleed easily.               Exam:  Blood pressure 122/62, pulse 92, temperature 36.4 °C (97.5 °F), resp. rate 16, height 1.676 m (5' 5.98\"), weight 64.411 kg (142 lb), SpO2 96 %.  General:  Well nourished, well developed male in NAD  Head is grossly normal.  Neck: Supple without JVD or bruit. Thyroid is not enlarged. Trachea is midline.  Pulmonary: Clear to ausculation .  Normal effort. No rales, ronchi, or wheezing.  Cardiovascular: Regular rate and rhythm without murmur.  Abdomen-Abdomen is soft, normal bowel sounds, no masses, guarding, ororganomegaly. Patient has 1+ epigastric and right upper quadrant tenderness to palpation, reducing bruising from his previous extensive level. There are no open wounds or drainage.  Lower extremities- neg for pretibial edema, redness, tenderness.      Please note that this dictation was created using voice recognition software. I have made every reasonable attempt to correct obvious errors, but I expect that there are errors of grammar and possibly content that I did not discover before finalizing the note.    Assessment/Plan:  1. Chronic neck pain     2. Anxiety     3. Right upper quadrant abdominal pain       Plan: 1. Patient is encouraged to continue on Nexium 20 mg by mouth daily  2. Trial of BuSpar 5 mg 2-3 times daily to reduce chronic anxiety  3. May continue on cautious intermittent use of Norco 10 mg-consider replacing this with tramadol in the future if possible  4. Recheck 3 weeks     "

## 2017-02-13 ENCOUNTER — OFFICE VISIT (OUTPATIENT)
Dept: MEDICAL GROUP | Facility: MEDICAL CENTER | Age: 67
End: 2017-02-13
Attending: FAMILY MEDICINE
Payer: MEDICAID

## 2017-02-13 VITALS
TEMPERATURE: 98 F | BODY MASS INDEX: 22.34 KG/M2 | HEART RATE: 80 BPM | WEIGHT: 139 LBS | SYSTOLIC BLOOD PRESSURE: 140 MMHG | HEIGHT: 66 IN | DIASTOLIC BLOOD PRESSURE: 70 MMHG | OXYGEN SATURATION: 97 % | RESPIRATION RATE: 14 BRPM

## 2017-02-13 DIAGNOSIS — E11.65 UNCONTROLLED TYPE 2 DIABETES MELLITUS WITH HYPERGLYCEMIA, WITH LONG-TERM CURRENT USE OF INSULIN (HCC): ICD-10-CM

## 2017-02-13 DIAGNOSIS — R10.10 PAIN OF UPPER ABDOMEN: ICD-10-CM

## 2017-02-13 DIAGNOSIS — Z79.4 UNCONTROLLED TYPE 2 DIABETES MELLITUS WITH HYPERGLYCEMIA, WITH LONG-TERM CURRENT USE OF INSULIN (HCC): ICD-10-CM

## 2017-02-13 DIAGNOSIS — F41.9 ANXIETY: ICD-10-CM

## 2017-02-13 PROCEDURE — 99212 OFFICE O/P EST SF 10 MIN: CPT | Performed by: FAMILY MEDICINE

## 2017-02-13 PROCEDURE — 99214 OFFICE O/P EST MOD 30 MIN: CPT | Performed by: FAMILY MEDICINE

## 2017-02-13 ASSESSMENT — PAIN SCALES - GENERAL: PAINLEVEL: 3=SLIGHT PAIN

## 2017-02-13 NOTE — ASSESSMENT & PLAN NOTE
Patient reports he received a notification from Medicaid that he will no longer have his Lantus insulin find it after May 1. There are alternative is Basaglar, which interestingly is available in pen form. Patient reports current blood sugars are variable from warnings 60 up to 1:30 and evenings 170-180. Patient reports that he is lightheaded, hungry, uncomfortable when the fasting sugar is down in the 60 range and immediately needs to eat to get relief. He is compliant taking metformin 1000 mg twice daily. Most recent A1c did show personal improvement down to 8.5 in January 2017.

## 2017-02-13 NOTE — MR AVS SNAPSHOT
"        Yonis De La Cruz   2017 10:10 AM   Office Visit   MRN: [id number]    Department[facility]   Dept Phone:  [phone]    Description:  Male : 1950   Provider:  Ronald Chaves M.D.           Reason for Visit     Abdominal Pain           Allergies as of 2017     Allergen Noted Reactions    Aspirin 2015       abd pain    Bloodless 2017         You were diagnosed with     Uncontrolled type 2 diabetes mellitus with hyperglycemia, with long-term current use of insulin (CMS-HCC)   [1902083]       Pain of upper abdomen   [170243]       Anxiety   [961136]         Vital Signs     Blood Pressure Pulse Temperature Respirations Height Weight    140/70 mmHg 80 36.7 °C (98 °F) 14 1.676 m (5' 5.98\") 63.05 kg (139 lb)    Body Mass Index Oxygen Saturation Smoking Status             22.45 kg/m2 97% Never Smoker          Basic Information     Date Of Birth Sex Race Ethnicity Preferred Language    1950 Male  or   Origin (Kenyan,Vietnamese,G[place]ian,[place], etc) English      Problem List              ICD-10-CM Priority Class Noted - Resolved    Chronic neck pain M54.2, G89.29   2015 - Present    Hyperlipidemia E78.5   Unknown - Present    Gastroesophageal reflux disease without esophagitis K21.9   7/10/2015 - Present    Tinnitus H93.19   7/10/2015 - Present    Uncontrolled type 2 diabetes mellitus with hyperglycemia, with long-term current use of insulin (CMS-HCC) E11.65, Z79.4   2016 - Present    Diabetes (CMS-HCC) E11.9   2017 - Present    Abdominal pain R10.9   2017 - Present    Leukocytosis D72.829   2017 - Present    Hyponatremia E87.1   2017 - Present    Anxiety F41.9   2017 - Present      Health Maintenance        Date Due Completion Dates    COLON CANCER SCREENING ANNUAL FIT 1950 ---    DIABETES MONOFILAMENT / LE EXAM 1951 ---    IMM DTaP/Tdap/Td Vaccine (1 - Tdap) 10/14/1971 ---    IMM ZOSTER VACCINE 10/14/2012 " ---    RETINAL SCREENING 6/9/2016 6/9/2015 (Done)    Override on 6/9/2015: Done    A1C SCREENING 7/4/2017 1/4/2017, 11/16/2016, 8/23/2016, 4/15/2016, 11/9/2015, 7/8/2015, 3/11/2015    URINE ACR / MICROALBUMIN 11/16/2017 11/16/2016, 11/9/2015, 3/11/2015    FASTING LIPID PROFILE 1/5/2018 1/5/2017, 11/16/2016, 11/9/2015, 3/11/2015    SERUM CREATININE 1/8/2018 1/8/2017, 1/7/2017, 1/6/2017, 1/5/2017, 1/4/2017, 11/9/2015, 3/11/2015    COLONOSCOPY 2/13/2027 2/13/2017 (N/S)    Override on 2/13/2017: (N/S) (last neg colonoscopy 7 yrs ago, due in 3 more)            Current Immunizations     Influenza Vaccine Quad Inj (Pf) 10/13/2016    Pneumococcal polysaccharide vaccine (PPSV-23) 11/21/2016  9:23 AM      Below and/or attached are the medications your provider expects you to take. Review all of your home medications and newly ordered medications with your provider and/or pharmacist. Follow medication instructions as directed by your provider and/or pharmacist. Please keep your medication list with you and share with your provider. Update the information when medications are discontinued, doses are changed, or new medications (including over-the-counter products) are added; and carry medication information at all times in the event of emergency situations     Allergies:  ASPIRIN - (reactions not documented)     BLOODLESS - (reactions not documented)               Medications  Valid as of: February 13, 2017 - 11:25 AM    Generic Name Brand Name Tablet Size Instructions for use    Acetaminophen (Tab) TYLENOL 325 MG Take 2 Tabs by mouth every 6 hours as needed for Mild Pain.        BusPIRone HCl (Tab) BUSPAR 5 MG Take 1 Tab by mouth 3 times a day.        Calcium   Take 1 Tab by mouth 3 times a day.        Esomeprazole Magnesium (Tablet Delayed Response) Esomeprazole Magnesium 20 MG Take 20 mg by mouth every day.        Esomeprazole Magnesium (CAPSULE DELAYED RELEASE) NEXIUM 20 MG Take 1 Cap by mouth every morning before  breakfast.        Hydrocodone-Acetaminophen (Tab) NORCO  MG Take 1 Tab by mouth every 8 hours as needed.        Insulin Glargine (Solution) LANTUS 100 UNIT/ML INJECT 20 UNITS SUBCUTANEOUSLY AS INSTRUCTED TWICE DAILY        Insulin Glargine (Solution Pen-injector) LANTUS 100 UNIT/ML Inject 20 Units as instructed 2 times a day.        Lisinopril (Tab) PRINIVIL 5 MG Take 1 Tab by mouth every day.        Magnesium Hydroxide (Suspension) MILK OF MAGNESIA 400 MG/5ML Take 30 mL by mouth 4 times a day as needed (constipation).        MetFORMIN HCl (Tab) GLUCOPHAGE 1000 MG Take 1 Tab by mouth 2 times a day, with meals.        Polyethylene Glycol 3350 (Pack) MIRALAX  Take  by mouth every day.        RaNITidine HCl (Tab) ZANTAC 300 MG Take 1 Tab by mouth every day.        Rosuvastatin Calcium (Tab) CRESTOR 40 MG TAKE ONE TABLET BY MOUTH ONCE DAILY        .                 Medicines prescribed today were sent to:     [facility] [address] [place], [place] [address] [address] [place], [place] [address] [address] [place] [id number]    Phone: [phone] Fax: [phone]    Open 24 Hours?: No      Medication refill instructions:       If your prescription bottle indicates you have medication refills left, it is not necessary to call your provider’s office. Please contact your pharmacy and they will refill your medication.    If your prescription bottle indicates you do not have any refills left, you may request refills at any time through one of the following ways: The online [employer] system (except Urgent Care), by calling your provider’s office, or by asking your pharmacy to contact your provider’s office with a refill request. Medication refills are processed only during regular business hours and may not be available until the next business day. Your provider may request additional information or to have a follow-up visit with you prior to refilling your medication.   *Please Note: Medication refills are assigned a new Rx number when refilled  electronically. Your pharmacy may indicate that no refills were authorized even though a new prescription for the same medication is available at the pharmacy. Please request the medicine by name with the pharmacy before contacting your provider for a refill.        Your To Do List     Future Labs/Procedures Complete By Expires    COMP METABOLIC PANEL  As directed 2/14/2018    HEMOGLOBIN A1C  As directed 2/14/2018      Referral     A referral request has been sent to our patient care coordination department. Please allow 3-5 business days for us to process this request and contact you either by phone or mail. If you do not hear from us by the 5th business day, please call us at [phone].        Other Notes About Your Plan     Fall risk done 07/10/2015           MyChart Status: Patient Declined

## 2017-02-13 NOTE — PROGRESS NOTES
Chief Complaint   Patient presents with   • Abdominal Pain       HISTORY OF PRESENT ILLNESS: Patient is a 66 y.o. male established patient who presents today to follow-up on uncontrolled diabetes, upper abdominal pain, anxiety        Uncontrolled type 2 diabetes mellitus with hyperglycemia, with long-term current use of insulin (CMS-HCC)  Patient reports he received a notification from Medicaid that he will no longer have his Lantus insulin find it after May 1. There are alternative is Basaglar, which interestingly is available in pen form. Patient reports current blood sugars are variable from warnings 60 up to 1:30 and evenings 170-180. Patient reports that he is lightheaded, hungry, uncomfortable when the fasting sugar is down in the 60 range and immediately needs to eat to get relief. He is compliant taking metformin 1000 mg twice daily. Most recent A1c did show personal improvement down to 8.5 in January 2017.    Pain of upper abdomen  Patient notes abdominal bloating and discomfort typically following certain foods including vegetables and beans. He is compliant taking Nexium and not reporting substernal burning, black or bloody stools. Patient notes no particular benefit with recent trial of MiraLAX powder.    Anxiety  Patient reports anxiety has improved with BuSpar but he reports that he feels generally sleepy and weak if he takes BuSpar 3 times per day. He has resorted to taking it just at bedtime which seems to help him with anxiety and nighttime sleep and not create side effects at other times of the day. He denies current depression, suicidal ideation, confusion   Social History-, retired    Patient Active Problem List    Diagnosis Date Noted   • Pain of upper abdomen 02/13/2017   • Anxiety 01/24/2017   • Leukocytosis 01/05/2017   • Hyponatremia 01/05/2017   • Diabetes (CMS-HCC) 01/04/2017   • Abdominal pain 01/04/2017   • Uncontrolled type 2 diabetes mellitus with hyperglycemia, with long-term  current use of insulin (CMS-[facility]) 11/21/2016   • Gastroesophageal reflux disease without esophagitis 07/10/2015   • Tinnitus 07/10/2015   • Chronic neck pain 02/27/2015   • Hyperlipidemia        Allergies:Aspirin and Bloodless    Current Outpatient Prescriptions   Medication Sig Dispense Refill   • metformin (GLUCOPHAGE) 1000 MG tablet Take 1 Tab by mouth 2 times a day, with meals. 60 Tab 6   • insulin glargine (LANTUS) 100 UNIT/ML Solution Pen-injector injection Inject 20 Units as instructed 2 times a day. 5 PEN 6   • busPIRone (BUSPAR) 5 MG Tab Take 1 Tab by mouth 3 times a day. 90 Tab 3   • hydrocodone/acetaminophen (NORCO)  MG Tab Take 1 Tab by mouth every 8 hours as needed. 30 Tab 0   • esomeprazole (NEXIUM 24HR) 20 MG capsule Take 1 Cap by mouth every morning before breakfast. 30 Cap 5   • acetaminophen (TYLENOL) 325 MG Tab Take 2 Tabs by mouth every 6 hours as needed for Mild Pain. 30 Tab 0   • magnesium hydroxide (MILK OF MAGNESIA) 400 MG/5ML Suspension Take 30 mL by mouth 4 times a day as needed (constipation).     • polyethylene glycol/lytes (MIRALAX) Pack Take  by mouth every day.  3   • CALCIUM PO Take 1 Tab by mouth 3 times a day.     • CRESTOR 40 MG tablet TAKE ONE TABLET BY MOUTH ONCE DAILY 30 Tab 3   • LANTUS 100 UNIT/ML Solution INJECT 20 UNITS SUBCUTANEOUSLY AS INSTRUCTED TWICE DAILY 20 mL 6   • lisinopril (PRINIVIL) 5 MG Tab Take 1 Tab by mouth every day. 30 Tab 11     No current facility-administered medications for this visit.       Social History   Substance Use Topics   • Smoking status: Never Smoker    • Smokeless tobacco: Never Used   • Alcohol Use: No      Comment: quit age 27       Family History   Problem Relation Age of Onset   • Diabetes Mother    • Cancer Neg Hx    • Heart Disease Neg Hx    • Stroke Neg Hx        ROS:  Review of Systems   Constitutional: Negative for fever, chills, weight loss and malaise/fatigue.   Eyes: Negative for blurred vision.   Respiratory: Negative for  "cough, sputum production, shortness of breath and wheezing.    Cardiovascular: Negative for chest pain, palpitations, orthopnea and leg swelling.    Musculoskeletal: Negative for myalgias, back pain and joint pain.   Endo/Heme/Allergies: Does not bruise/bleed easily.               Exam:  Blood pressure 140/70, pulse 80, temperature 36.7 °C (98 °F), resp. rate 14, height 1.676 m (5' 5.98\"), weight 63.05 kg (139 lb), SpO2 97 %.  General:  Well nourished, well developed male in NAD  Head is grossly normal.  Neck: Supple without JVD or bruit. Thyroid is not enlarged. Trachea is midline.  Pulmonary: Clear to ausculation .  Normal effort. No rales, ronchi, or wheezing.  Cardiovascular: Regular rate and rhythm without murmur.  Abdomen-Abdomen is soft, normal bowel sounds, no masses, guarding, ororganomegaly, or tenderness.  Lower extremities- neg for pretibial edema, redness, tenderness.      Please note that this dictation was created using voice recognition software. I have made every reasonable attempt to correct obvious errors, but I expect that there are errors of grammar and possibly content that I did not discover before finalizing the note.    Assessment/Plan:  1. Uncontrolled type 2 diabetes mellitus with hyperglycemia, with long-term current use of insulin (CMS-[facility])  COMP METABOLIC PANEL    HEMOGLOBIN A1C    REFERRAL TO OPTOMETRY    REFERRAL FOR RETINAL SCREENING EXAM    Diabetic Monofilament Lower Extremity Exam   2. Pain of upper abdomen  REFERRAL TO GASTROENTEROLOGY   3. Anxiety       Plan: 1. Discussed importance of avoiding those food items that clearly contribute to his gas and bloating  2. GI referral to obtain second opinion regarding current abdominal bloating and upper abdominal pain  3. New Rx for alternative to Lantus insulin pens (Basaglar) at same dose 20 units twice a day  4. Update A1c in 2 months     "

## 2017-02-13 NOTE — ASSESSMENT & PLAN NOTE
Patient reports anxiety has improved with BuSpar but he reports that he feels generally sleepy and weak if he takes BuSpar 3 times per day. He has resorted to taking it just at bedtime which seems to help him with anxiety and nighttime sleep and not create side effects at other times of the day. He denies current depression, suicidal ideation, confusion

## 2017-02-13 NOTE — ASSESSMENT & PLAN NOTE
Patient notes abdominal bloating and discomfort typically following certain foods including vegetables and beans. He is compliant taking Nexium and not reporting substernal burning, black or bloody stools. Patient notes no particular benefit with recent trial of MiraLAX powder.

## 2017-02-27 RX ORDER — ROSUVASTATIN CALCIUM 40 MG/1
TABLET, COATED ORAL
Qty: 30 TAB | Refills: 5 | Status: SHIPPED | OUTPATIENT
Start: 2017-02-27 | End: 2017-05-30 | Stop reason: SDUPTHER

## 2017-02-28 ENCOUNTER — TELEPHONE (OUTPATIENT)
Dept: MEDICAL GROUP | Facility: MEDICAL CENTER | Age: 67
End: 2017-02-28

## 2017-02-28 NOTE — TELEPHONE ENCOUNTER
1. Caller Name: [facility]                                          Call Back Number: [phone]      Patient approves a detailed voicemail message: yes and N\A    2. What supplies does the patient need? Pen needles    3. What brand of meter does the patient use? Unknown    4. How many times a day is the patient testing? Twice daily    Dx: E11.65, is using insulin. Last A1c =   Lab Results   Component Value Date/Time    GLYCOHEMOGLOBIN 8.5* 01/04/2017 03:20 AM    GLYCOHEMOGLOBIN 9.0* 11/16/2016 06:53 AM   .

## 2017-03-27 RX ORDER — RANITIDINE 300 MG/1
TABLET ORAL
Qty: 30 TAB | Refills: 11 | Status: SHIPPED | OUTPATIENT
Start: 2017-03-27

## 2017-03-27 RX ORDER — INSULIN GLARGINE 100 [IU]/ML
INJECTION, SOLUTION SUBCUTANEOUS
Qty: 20 ML | Refills: 6 | Status: SHIPPED | OUTPATIENT
Start: 2017-03-27 | End: 2019-03-02 | Stop reason: SDUPTHER

## 2017-04-27 RX ORDER — LANCETS 26 GAUGE
EACH MISCELLANEOUS
Qty: 100 EACH | Refills: 6 | Status: SHIPPED | OUTPATIENT
Start: 2017-04-27

## 2017-05-05 ENCOUNTER — HOSPITAL ENCOUNTER (OUTPATIENT)
Dept: LAB | Facility: MEDICAL CENTER | Age: 67
End: 2017-05-05
Attending: FAMILY MEDICINE
Payer: MEDICAID

## 2017-05-05 DIAGNOSIS — E11.65 UNCONTROLLED TYPE 2 DIABETES MELLITUS WITH HYPERGLYCEMIA, WITH LONG-TERM CURRENT USE OF INSULIN (HCC): ICD-10-CM

## 2017-05-05 DIAGNOSIS — Z79.4 UNCONTROLLED TYPE 2 DIABETES MELLITUS WITH HYPERGLYCEMIA, WITH LONG-TERM CURRENT USE OF INSULIN (HCC): ICD-10-CM

## 2017-05-05 LAB
ALBUMIN SERPL BCP-MCNC: 4 G/DL (ref 3.2–4.9)
ALBUMIN/GLOB SERPL: 1.3 G/DL
ALP SERPL-CCNC: 73 U/L (ref 30–99)
ALT SERPL-CCNC: 15 U/L (ref 2–50)
ANION GAP SERPL CALC-SCNC: 7 MMOL/L (ref 0–11.9)
AST SERPL-CCNC: 16 U/L (ref 12–45)
BILIRUB SERPL-MCNC: 0.4 MG/DL (ref 0.1–1.5)
BUN SERPL-MCNC: 21 MG/DL (ref 8–22)
CALCIUM SERPL-MCNC: 9.6 MG/DL (ref 8.5–10.5)
CHLORIDE SERPL-SCNC: 106 MMOL/L (ref 96–112)
CO2 SERPL-SCNC: 29 MMOL/L (ref 20–33)
CREAT SERPL-MCNC: 0.6 MG/DL (ref 0.5–1.4)
EST. AVERAGE GLUCOSE BLD GHB EST-MCNC: 192 MG/DL
GFR SERPL CREATININE-BSD FRML MDRD: >60 ML/MIN/1.73 M 2
GLOBULIN SER CALC-MCNC: 3.1 G/DL (ref 1.9–3.5)
GLUCOSE SERPL-MCNC: 106 MG/DL (ref 65–99)
HBA1C MFR BLD: 8.3 % (ref 0–5.6)
POTASSIUM SERPL-SCNC: 4.2 MMOL/L (ref 3.6–5.5)
PROT SERPL-MCNC: 7.1 G/DL (ref 6–8.2)
SODIUM SERPL-SCNC: 142 MMOL/L (ref 135–145)

## 2017-05-05 PROCEDURE — 36415 COLL VENOUS BLD VENIPUNCTURE: CPT

## 2017-05-05 PROCEDURE — 80053 COMPREHEN METABOLIC PANEL: CPT

## 2017-05-05 PROCEDURE — 83036 HEMOGLOBIN GLYCOSYLATED A1C: CPT

## 2017-05-08 ENCOUNTER — OFFICE VISIT (OUTPATIENT)
Dept: MEDICAL GROUP | Facility: MEDICAL CENTER | Age: 67
End: 2017-05-08
Attending: FAMILY MEDICINE
Payer: MEDICAID

## 2017-05-08 VITALS
TEMPERATURE: 97.6 F | DIASTOLIC BLOOD PRESSURE: 60 MMHG | HEIGHT: 66 IN | HEART RATE: 72 BPM | BODY MASS INDEX: 23.72 KG/M2 | RESPIRATION RATE: 16 BRPM | SYSTOLIC BLOOD PRESSURE: 112 MMHG | WEIGHT: 147.6 LBS | OXYGEN SATURATION: 97 %

## 2017-05-08 DIAGNOSIS — F41.9 ANXIETY: ICD-10-CM

## 2017-05-08 DIAGNOSIS — R10.10 PAIN OF UPPER ABDOMEN: ICD-10-CM

## 2017-05-08 DIAGNOSIS — G89.29 CHRONIC NECK PAIN: ICD-10-CM

## 2017-05-08 DIAGNOSIS — M54.2 CHRONIC NECK PAIN: ICD-10-CM

## 2017-05-08 DIAGNOSIS — E11.65 UNCONTROLLED TYPE 2 DIABETES MELLITUS WITH HYPERGLYCEMIA, WITH LONG-TERM CURRENT USE OF INSULIN (HCC): ICD-10-CM

## 2017-05-08 DIAGNOSIS — Z79.4 UNCONTROLLED TYPE 2 DIABETES MELLITUS WITH HYPERGLYCEMIA, WITH LONG-TERM CURRENT USE OF INSULIN (HCC): ICD-10-CM

## 2017-05-08 PROCEDURE — 99214 OFFICE O/P EST MOD 30 MIN: CPT | Performed by: FAMILY MEDICINE

## 2017-05-08 PROCEDURE — 99213 OFFICE O/P EST LOW 20 MIN: CPT | Performed by: FAMILY MEDICINE

## 2017-05-08 RX ORDER — HYDROCODONE BITARTRATE AND ACETAMINOPHEN 10; 325 MG/1; MG/1
1 TABLET ORAL EVERY 8 HOURS PRN
Qty: 30 TAB | Refills: 0 | Status: SHIPPED | OUTPATIENT
Start: 2017-05-08 | End: 2017-06-05 | Stop reason: SDUPTHER

## 2017-05-08 ASSESSMENT — PAIN SCALES - GENERAL: PAINLEVEL: NO PAIN

## 2017-05-08 NOTE — PROGRESS NOTES
Chief Complaint   Patient presents with   • Diabetes       HISTORY OF PRESENT ILLNESS: Patient is a 66 y.o. male established patient who presents today to follow-up on type 2 diabetes mellitus, anxiety, gassy abdominal pain        Uncontrolled type 2 diabetes mellitus with hyperglycemia, with long-term current use of insulin (CMS-HCC)  Patient reports morning sugars are typically running 70s to 90s highest recently has been 150. New A1c has returned improved at 8.3 down from 8.6. Patient is injecting 20 units of Lantus once an evening and also taking metformin 1000 mg twice daily. Not reporting any symptomatic low blood sugars characterized by headache or diaphoresis    Anxiety  Patient reports that he typically takes a single 5 mg dose of buspirone in the evenings and reports overall anxiety is well controlled. Not reporting tearfulness or suicidal ideation. He is retired.    Abdominal pain  Continued gassy abdominal bloating on a daily basis. Substernal burning is currently controlled taking ranitidine which is more effective for him than previous Nexium. GI appointment is approaching. No black or bloody stools. Notes constipation only if he takes an occasional Norco for neck pain.      Patient Active Problem List    Diagnosis Date Noted   • Pain of upper abdomen 02/13/2017   • Anxiety 01/24/2017   • Leukocytosis 01/05/2017   • Hyponatremia 01/05/2017   • Diabetes (CMS-HCC) 01/04/2017   • Abdominal pain 01/04/2017   • Uncontrolled type 2 diabetes mellitus with hyperglycemia, with long-term current use of insulin (CMS-HCC) 11/21/2016   • Gastroesophageal reflux disease without esophagitis 07/10/2015   • Tinnitus 07/10/2015   • Chronic neck pain 02/27/2015   • Hyperlipidemia     Social History-, retired    Allergies:Aspirin and Bloodless    Current Outpatient Prescriptions   Medication Sig Dispense Refill   • hydrocodone/acetaminophen (NORCO)  MG Tab Take 1 Tab by mouth every 8 hours as needed. 30 Tab 0    • TRUEPLUS LANCETS 26G Misc USE ONE LANCET  TWICE DAILY AND  AS  NEEDED  FOR  HIGH  OR  LOW  SUGAR 100 Each 6   • insulin glargine (LANTUS) 100 UNIT/ML Solution Pen-injector injection Inject 20 Units as instructed 2 times a day. 5 PEN 6   • LANTUS 100 UNIT/ML Solution INJECT 20 UNITS SUBCUTANEOUSLY TWICE DAILY AS INSTRUCTED 20 mL 6   • ranitidine (ZANTAC) 300 MG tablet TAKE ONE TABLET BY MOUTH ONCE DAILY 30 Tab 11   • Insulin Pen Needle (CAREFINE PEN NEEDLES) 31G X 6 MM Misc 1 Units by Does not apply route 2 Times a Day. 100 Each 11   • rosuvastatin (CRESTOR) 40 MG tablet TAKE ONE TABLET BY MOUTH ONCE DAILY 30 Tab 5   • metformin (GLUCOPHAGE) 1000 MG tablet Take 1 Tab by mouth 2 times a day, with meals. 60 Tab 6   • busPIRone (BUSPAR) 5 MG Tab Take 1 Tab by mouth 3 times a day. 90 Tab 3   • acetaminophen (TYLENOL) 325 MG Tab Take 2 Tabs by mouth every 6 hours as needed for Mild Pain. 30 Tab 0   • magnesium hydroxide (MILK OF MAGNESIA) 400 MG/5ML Suspension Take 30 mL by mouth 4 times a day as needed (constipation).     • polyethylene glycol/lytes (MIRALAX) Pack Take  by mouth every day.  3   • CALCIUM PO Take 1 Tab by mouth 3 times a day.     • lisinopril (PRINIVIL) 5 MG Tab Take 1 Tab by mouth every day. 30 Tab 11     No current facility-administered medications for this visit.       Social History   Substance Use Topics   • Smoking status: Never Smoker    • Smokeless tobacco: Never Used   • Alcohol Use: No      Comment: quit age 27       Family History   Problem Relation Age of Onset   • Diabetes Mother    • Cancer Neg Hx    • Heart Disease Neg Hx    • Stroke Neg Hx        ROS:  Review of Systems   Constitutional: Negative for fever, chills, weight loss and malaise/fatigue.   Eyes: Negative for blurred vision.   Respiratory: Negative for cough, sputum production, shortness of breath and wheezing.    Cardiovascular: Negative for chest pain, palpitations, orthopnea and leg swelling.   Musculoskeletal: Negative for  "myalgias, back pain. Positive for occasional nighttime neck pain disturbing sleep  Endo/Heme/Allergies: Does not bruise/bleed easily.               Exam:  Blood pressure 112/60, pulse 72, temperature 36.4 °C (97.6 °F), resp. rate 16, height 1.676 m (5' 5.98\"), weight 66.951 kg (147 lb 9.6 oz), SpO2 97 %.  General:  Well nourished, well developed male in NAD  Head is grossly normal.  Neck: Supple without JVD or bruit. Thyroid is not enlarged. Trachea is midline. Mildly tender over the posterior midline elements to palpation  Pulmonary: Clear to ausculation .  Normal effort. No rales, ronchi, or wheezing.  Cardiovascular: Regular rate and rhythm without murmur.  Abdomen-Abdomen is soft, normal bowel sounds, no masses, guarding, ororganomegaly, or tenderness.  Lower extremities- neg for pretibial edema, redness, tenderness.      Please note that this dictation was created using voice recognition software. I have made every reasonable attempt to correct obvious errors, but I expect that there are errors of grammar and possibly content that I did not discover before finalizing the note.    Assessment/Plan:  1. Uncontrolled type 2 diabetes mellitus with hyperglycemia, with long-term current use of insulin (CMS-HCC)     2. Pain of upper abdomen     3. Anxiety     4. Chronic neck pain  hydrocodone/acetaminophen (NORCO)  MG Tab      Plan: 1. Diabetes retinal exam scheduled for 2 days  2. Revisit with me 3 months with updated A1c, CMP, urine microalbumin at that time  3. Patient is cautioned to be vigilant regarding starch portions in lunches and dinners suspect current elevated sugars are probably in the second half of the day since morning sugars are so well controlled  4. Renew limited use of Norco for occasional neck pain disturbing sleep  5. Appointment with GI see within 3 weeks for review of abdominal pain/gas.    "

## 2017-05-08 NOTE — MR AVS SNAPSHOT
"        Yonis De La Cruz   2017 7:30 AM   Office Visit   MRN: [id number]    [facility]   Dept Phone:  [phone]    Description:  Male : 1950   Provider:  Ronald Chaves M.D.           Reason for Visit     Diabetes           Allergies as of 2017     Allergen Noted Reactions    Aspirin 2015       abd pain    Bloodless 2017         You were diagnosed with     Uncontrolled type 2 diabetes mellitus with hyperglycemia, with long-term current use of insulin (CMS-HCC)   [1044511]       Pain of upper abdomen   [226815]       Anxiety   [457125]       Chronic neck pain   [375773]         Vital Signs     Blood Pressure Pulse Temperature Respirations Height Weight    112/60 mmHg 72 36.4 °C (97.6 °F) 16 1.676 m (5' 5.98\") 66.951 kg (147 lb 9.6 oz)    Body Mass Index Oxygen Saturation Smoking Status             23.83 kg/m2 97% Never Smoker          Basic Information     Date Of Birth Sex Race Ethnicity Preferred Language    1950 Male  or   Origin (Thai,Central African,Dominican,Guatemalan, etc) English      Problem List              ICD-10-CM Priority Class Noted - Resolved    Chronic neck pain M54.2, G89.29   2015 - Present    Hyperlipidemia E78.5   Unknown - Present    Gastroesophageal reflux disease without esophagitis K21.9   7/10/2015 - Present    Tinnitus H93.19   7/10/2015 - Present    Uncontrolled type 2 diabetes mellitus with hyperglycemia, with long-term current use of insulin (CMS-HCC) E11.65, Z79.4   2016 - Present    Diabetes (CMS-HCC) E11.9   2017 - Present    Abdominal pain R10.9   2017 - Present    Leukocytosis D72.829   2017 - Present    Hyponatremia E87.1   2017 - Present    Anxiety F41.9   2017 - Present    Pain of upper abdomen R10.10   2017 - Present      Health Maintenance        Date Due Completion Dates    COLON CANCER SCREENING ANNUAL FIT 1950 ---    IMM DTaP/Tdap/Td Vaccine (1 - Tdap) " 10/14/1971 ---    IMM ZOSTER VACCINE 10/14/2012 ---    RETINAL SCREENING 6/9/2016 6/9/2015 (Done)    Override on 6/9/2015: Done    A1C SCREENING 11/5/2017 5/5/2017, 1/4/2017, 11/16/2016, 8/23/2016, 4/15/2016, 11/9/2015, 7/8/2015, 3/11/2015    URINE ACR / MICROALBUMIN 11/16/2017 11/16/2016, 11/9/2015, 3/11/2015    FASTING LIPID PROFILE 1/5/2018 1/5/2017, 11/16/2016, 11/9/2015, 3/11/2015    DIABETES MONOFILAMENT / LE EXAM 2/13/2018 2/13/2017    SERUM CREATININE 5/5/2018 5/5/2017, 1/8/2017, 1/7/2017, 1/6/2017, 1/5/2017, 1/4/2017, 11/9/2015, 3/11/2015    COLONOSCOPY 2/13/2027 2/13/2017 (N/S)    Override on 2/13/2017: (N/S) (last neg colonoscopy 7 yrs ago, due in 3 more)            Current Immunizations     Influenza Vaccine Quad Inj (Pf) 10/13/2016    Pneumococcal polysaccharide vaccine (PPSV-23) 11/21/2016  9:23 AM      Below and/or attached are the medications your provider expects you to take. Review all of your home medications and newly ordered medications with your provider and/or pharmacist. Follow medication instructions as directed by your provider and/or pharmacist. Please keep your medication list with you and share with your provider. Update the information when medications are discontinued, doses are changed, or new medications (including over-the-counter products) are added; and carry medication information at all times in the event of emergency situations     Allergies:  ASPIRIN - (reactions not documented)     BLOODLESS - (reactions not documented)               Medications  Valid as of: May 08, 2017 -  8:21 AM    Generic Name Brand Name Tablet Size Instructions for use    Acetaminophen (Tab) TYLENOL 325 MG Take 2 Tabs by mouth every 6 hours as needed for Mild Pain.        BusPIRone HCl (Tab) BUSPAR 5 MG Take 1 Tab by mouth 3 times a day.        Calcium   Take 1 Tab by mouth 3 times a day.        Hydrocodone-Acetaminophen (Tab) NORCO  MG Take 1 Tab by mouth every 8 hours as needed.        Insulin  Glargine (Solution) LANTUS 100 UNIT/ML INJECT 20 UNITS SUBCUTANEOUSLY TWICE DAILY AS INSTRUCTED        Insulin Glargine (Solution Pen-injector) LANTUS 100 UNIT/ML Inject 20 Units as instructed 2 times a day.        Insulin Pen Needle (Misc) Insulin Pen Needle 31G X 6 MM 1 Units by Does not apply route 2 Times a Day.        Lancets (Misc) TRUEPLUS LANCETS 26G  USE ONE LANCET  TWICE DAILY AND  AS  NEEDED  FOR  HIGH  OR  LOW  SUGAR        Lisinopril (Tab) PRINIVIL 5 MG Take 1 Tab by mouth every day.        Magnesium Hydroxide (Suspension) MILK OF MAGNESIA 400 MG/5ML Take 30 mL by mouth 4 times a day as needed (constipation).        MetFORMIN HCl (Tab) GLUCOPHAGE 1000 MG Take 1 Tab by mouth 2 times a day, with meals.        Polyethylene Glycol 3350 (Pack) MIRALAX  Take  by mouth every day.        RaNITidine HCl (Tab) ZANTAC 300 MG TAKE ONE TABLET BY MOUTH ONCE DAILY        Rosuvastatin Calcium (Tab) CRESTOR 40 MG TAKE ONE TABLET BY MOUTH ONCE DAILY        .                 Medicines prescribed today were sent to:     [facility] [address] [place] [address] [address]     [address] [place] [address]    Phone: [phone] Fax: [phone]    Open 24 Hours?: No      Medication refill instructions:       If your prescription bottle indicates you have medication refills left, it is not necessary to call your provider’s office. Please contact your pharmacy and they will refill your medication.    If your prescription bottle indicates you do not have any refills left, you may request refills at any time through one of the following ways: The online [employer] system (except Urgent Care), by calling your provider’s office, or by asking your pharmacy to contact your provider’s office with a refill request. Medication refills are processed only during regular business hours and may not be available until the next business day. Your provider may request additional information or to have a follow-up visit with you prior to refilling  your medication.   *Please Note: Medication refills are assigned a new Rx number when refilled electronically. Your pharmacy may indicate that no refills were authorized even though a new prescription for the same medication is available at the pharmacy. Please request the medicine by name with the pharmacy before contacting your provider for a refill.        Your To Do List     Future Labs/Procedures Complete By Expires    COMP METABOLIC PANEL  As directed 5/9/2018    HEMOGLOBIN A1C  As directed 5/9/2018    MICROALBUMIN CREAT RATIO URINE  As directed 5/9/2018      Other Notes About Your Plan     Fall risk done 07/10/2015           [place] Status: Patient Declined

## 2017-05-08 NOTE — ASSESSMENT & PLAN NOTE
Continued gassy abdominal bloating on a daily basis. Substernal burning is currently controlled taking ranitidine which is more effective for him than previous Nexium. GI appointment is approaching. No black or bloody stools. Notes constipation only if he takes an occasional Norco for neck pain.

## 2017-05-08 NOTE — ASSESSMENT & PLAN NOTE
Patient reports that he typically takes a single 5 mg dose of buspirone in the evenings and reports overall anxiety is well controlled. Not reporting tearfulness or suicidal ideation. He is retired.

## 2017-05-08 NOTE — ASSESSMENT & PLAN NOTE
Patient reports morning sugars are typically running 70s to 90s highest recently has been 150. New A1c has returned improved at 8.3 down from 8.6. Patient is injecting 20 units of Lantus once an evening and also taking metformin 1000 mg twice daily. Not reporting any symptomatic low blood sugars characterized by headache or diaphoresis

## 2017-05-30 RX ORDER — ROSUVASTATIN CALCIUM 40 MG/1
TABLET, COATED ORAL
Qty: 30 TAB | Refills: 0 | Status: SHIPPED | OUTPATIENT
Start: 2017-05-30 | End: 2017-07-17 | Stop reason: SDUPTHER

## 2017-06-05 ENCOUNTER — TELEPHONE (OUTPATIENT)
Dept: MEDICAL GROUP | Facility: MEDICAL CENTER | Age: 67
End: 2017-06-05

## 2017-06-05 DIAGNOSIS — M54.2 CHRONIC NECK PAIN: ICD-10-CM

## 2017-06-05 DIAGNOSIS — G89.29 CHRONIC NECK PAIN: ICD-10-CM

## 2017-06-05 RX ORDER — HYDROCODONE BITARTRATE AND ACETAMINOPHEN 10; 325 MG/1; MG/1
1 TABLET ORAL EVERY 8 HOURS PRN
Qty: 30 TAB | Refills: 0 | Status: SHIPPED | OUTPATIENT
Start: 2017-06-05

## 2017-06-22 ENCOUNTER — HOSPITAL ENCOUNTER (OUTPATIENT)
Dept: LAB | Facility: MEDICAL CENTER | Age: 67
End: 2017-06-22
Attending: INTERNAL MEDICINE
Payer: MEDICAID

## 2017-06-22 LAB
BASOPHILS # BLD AUTO: 0.2 % (ref 0–1.8)
BASOPHILS # BLD: 0.01 K/UL (ref 0–0.12)
EOSINOPHIL # BLD AUTO: 0.22 K/UL (ref 0–0.51)
EOSINOPHIL NFR BLD: 3.5 % (ref 0–6.9)
ERYTHROCYTE [DISTWIDTH] IN BLOOD BY AUTOMATED COUNT: 47.9 FL (ref 35.9–50)
HCT VFR BLD AUTO: 46.2 % (ref 42–52)
HGB BLD-MCNC: 14.8 G/DL (ref 14–18)
IMM GRANULOCYTES # BLD AUTO: 0.01 K/UL (ref 0–0.11)
IMM GRANULOCYTES NFR BLD AUTO: 0.2 % (ref 0–0.9)
LYMPHOCYTES # BLD AUTO: 1.85 K/UL (ref 1–4.8)
LYMPHOCYTES NFR BLD: 29.7 % (ref 22–41)
MCH RBC QN AUTO: 27.4 PG (ref 27–33)
MCHC RBC AUTO-ENTMCNC: 32 G/DL (ref 33.7–35.3)
MCV RBC AUTO: 85.4 FL (ref 81.4–97.8)
MONOCYTES # BLD AUTO: 0.43 K/UL (ref 0–0.85)
MONOCYTES NFR BLD AUTO: 6.9 % (ref 0–13.4)
NEUTROPHILS # BLD AUTO: 3.7 K/UL (ref 1.82–7.42)
NEUTROPHILS NFR BLD: 59.5 % (ref 44–72)
NRBC # BLD AUTO: 0 K/UL
NRBC BLD AUTO-RTO: 0 /100 WBC
PLATELET # BLD AUTO: 141 K/UL (ref 164–446)
PMV BLD AUTO: 9.4 FL (ref 9–12.9)
RBC # BLD AUTO: 5.41 M/UL (ref 4.7–6.1)
WBC # BLD AUTO: 6.2 K/UL (ref 4.8–10.8)

## 2017-06-22 PROCEDURE — 85025 COMPLETE CBC W/AUTO DIFF WBC: CPT

## 2017-06-22 PROCEDURE — 36415 COLL VENOUS BLD VENIPUNCTURE: CPT

## 2017-06-26 ENCOUNTER — TELEPHONE (OUTPATIENT)
Dept: MEDICAL GROUP | Facility: MEDICAL CENTER | Age: 67
End: 2017-06-26

## 2017-07-17 RX ORDER — ROSUVASTATIN CALCIUM 40 MG/1
TABLET, COATED ORAL
Qty: 30 TAB | Refills: 3 | Status: SHIPPED | OUTPATIENT
Start: 2017-07-17

## 2017-09-21 ENCOUNTER — HOSPITAL ENCOUNTER (OUTPATIENT)
Dept: LAB | Facility: MEDICAL CENTER | Age: 67
End: 2017-09-21
Attending: FAMILY MEDICINE
Payer: MEDICAID

## 2017-09-21 DIAGNOSIS — E11.65 UNCONTROLLED TYPE 2 DIABETES MELLITUS WITH HYPERGLYCEMIA, WITH LONG-TERM CURRENT USE OF INSULIN (HCC): ICD-10-CM

## 2017-09-21 DIAGNOSIS — Z79.4 UNCONTROLLED TYPE 2 DIABETES MELLITUS WITH HYPERGLYCEMIA, WITH LONG-TERM CURRENT USE OF INSULIN (HCC): ICD-10-CM

## 2017-09-21 LAB
ALBUMIN SERPL BCP-MCNC: 4.4 G/DL (ref 3.2–4.9)
ALBUMIN/GLOB SERPL: 1.6 G/DL
ALP SERPL-CCNC: 70 U/L (ref 30–99)
ALT SERPL-CCNC: 17 U/L (ref 2–50)
ANION GAP SERPL CALC-SCNC: 7 MMOL/L (ref 0–11.9)
AST SERPL-CCNC: 16 U/L (ref 12–45)
BILIRUB SERPL-MCNC: 0.5 MG/DL (ref 0.1–1.5)
BUN SERPL-MCNC: 18 MG/DL (ref 8–22)
CALCIUM SERPL-MCNC: 9.1 MG/DL (ref 8.5–10.5)
CHLORIDE SERPL-SCNC: 104 MMOL/L (ref 96–112)
CO2 SERPL-SCNC: 29 MMOL/L (ref 20–33)
CREAT SERPL-MCNC: 0.6 MG/DL (ref 0.5–1.4)
CREAT UR-MCNC: 163.8 MG/DL
EST. AVERAGE GLUCOSE BLD GHB EST-MCNC: 220 MG/DL
GFR SERPL CREATININE-BSD FRML MDRD: >60 ML/MIN/1.73 M 2
GLOBULIN SER CALC-MCNC: 2.7 G/DL (ref 1.9–3.5)
GLUCOSE SERPL-MCNC: 219 MG/DL (ref 65–99)
HBA1C MFR BLD: 9.3 % (ref 0–5.6)
MICROALBUMIN UR-MCNC: 1.9 MG/DL
MICROALBUMIN/CREAT UR: 12 MG/G (ref 0–30)
POTASSIUM SERPL-SCNC: 4.5 MMOL/L (ref 3.6–5.5)
PROT SERPL-MCNC: 7.1 G/DL (ref 6–8.2)
SODIUM SERPL-SCNC: 140 MMOL/L (ref 135–145)

## 2017-09-21 PROCEDURE — 83036 HEMOGLOBIN GLYCOSYLATED A1C: CPT

## 2017-09-21 PROCEDURE — 80053 COMPREHEN METABOLIC PANEL: CPT

## 2017-09-21 PROCEDURE — 82043 UR ALBUMIN QUANTITATIVE: CPT

## 2017-09-21 PROCEDURE — 82570 ASSAY OF URINE CREATININE: CPT

## 2017-09-21 PROCEDURE — 36415 COLL VENOUS BLD VENIPUNCTURE: CPT

## 2017-09-29 ENCOUNTER — OFFICE VISIT (OUTPATIENT)
Dept: MEDICAL GROUP | Facility: MEDICAL CENTER | Age: 67
End: 2017-09-29
Attending: FAMILY MEDICINE
Payer: MEDICAID

## 2017-09-29 VITALS
HEART RATE: 72 BPM | DIASTOLIC BLOOD PRESSURE: 64 MMHG | RESPIRATION RATE: 16 BRPM | HEIGHT: 66 IN | WEIGHT: 151 LBS | TEMPERATURE: 98.1 F | OXYGEN SATURATION: 96 % | SYSTOLIC BLOOD PRESSURE: 102 MMHG | BODY MASS INDEX: 24.27 KG/M2

## 2017-09-29 DIAGNOSIS — E11.65 UNCONTROLLED TYPE 2 DIABETES MELLITUS WITH HYPERGLYCEMIA, WITH LONG-TERM CURRENT USE OF INSULIN (HCC): ICD-10-CM

## 2017-09-29 DIAGNOSIS — Z79.4 UNCONTROLLED TYPE 2 DIABETES MELLITUS WITH HYPERGLYCEMIA, WITH LONG-TERM CURRENT USE OF INSULIN (HCC): ICD-10-CM

## 2017-09-29 PROBLEM — E11.9 DIABETES (HCC): Status: RESOLVED | Noted: 2017-01-04 | Resolved: 2017-09-29

## 2017-09-29 PROCEDURE — 99213 OFFICE O/P EST LOW 20 MIN: CPT | Performed by: FAMILY MEDICINE

## 2017-09-29 ASSESSMENT — PAIN SCALES - GENERAL: PAINLEVEL: NO PAIN

## 2017-09-29 NOTE — PROGRESS NOTES
"Subjective:      Yonis De La Cruz is a 66 y.o. male who presents with Diabetes            HPI 1. Uncontrolled diabetes mellitus-patient cordell is compliant taking 1000 mg twice daily of metformin along with 20 units of Lantus (Basaglar) once daily. He has been to [place] for the last 2 months on vacation and admits that diet is probably loosened. Denies any low blood sugars characterized by headache or diaphoresis.    ROS negative for focal numbness, polyuria, dysuria       Objective:     /64   Pulse 72   Temp 36.7 °C (98.1 °F)   Resp 16   Ht 1.676 m (5' 5.98\")   Wt 68.5 kg (151 lb)   SpO2 96%   BMI 24.38 kg/m²      Physical Exam  Gen.- alert, cooperative, in no acute distress  Neck- midline trachea, thyroid not enlarged or tender,supple, no cervical adenopathy  Chest-clear to auscultation and percussion with normal breath sounds. No retractions. Chest wall nontender  Cardiac- regular rhythm and rate. No murmur, thrill, or heave  Feet-with shoes and socks removed, feet show intact skin without redness, increased warmth, fissures, ulceration, swelling. Monofilament testing 10/10 bilaterally            Assessment/Plan:     1. Uncontrolled type 2 diabetes mellitus with hyperglycemia, with long-term current use of insulin (CMS-[facility])    - REFERRAL TO OPHTHALMOLOGY    Plan: 1. Ophthalmology referral to update retinal exam  2. Patient will check sugars before breakfast and dinner and log this over the next 2 weeks with review of that data in 2 weeks    "

## 2018-08-13 RX ORDER — PEN NEEDLE, DIABETIC 32GX 5/32"
NEEDLE, DISPOSABLE MISCELLANEOUS
Qty: 100 EACH | Refills: 11 | Status: SHIPPED | OUTPATIENT
Start: 2018-08-13

## 2019-03-04 RX ORDER — INSULIN GLARGINE 100 [IU]/ML
INJECTION, SOLUTION SUBCUTANEOUS
Qty: 20 ML | Refills: 0 | Status: SHIPPED | OUTPATIENT
Start: 2019-03-04

## 2020-10-17 NOTE — TELEPHONE ENCOUNTER
Pa approved  
Your insurance company has requested an additional authorization for your Crestor 40 mg. We have initiated a prior authorization process, and we expect to complete this in 7-10 business days. On rare occasions prior authorizations may take longer than expected.      The status of your prior authorization for Crestor is: still pending on 6/26/2017     Here is the information we have for you on file:    Insurance company: [facility]  Pharmacy:   [facility] [address] [facility] [place] - [address]  [address]  [place] [address]  Phone: [phone] Fax: [phone]      If you have any questions or if any of this information is incorrect, please don't hesitate to respond to this [employer] [place] email.    
English

## 2021-03-03 DIAGNOSIS — Z23 NEED FOR VACCINATION: ICD-10-CM

## (undated) DEVICE — SET EXTENSION WITH 2 PORTS (48EA/CA) ***PART #2C8610 IS A SUBSTITUTE*****

## (undated) DEVICE — PACK LAP CHOLE OR - (2EA/CA)

## (undated) DEVICE — TROCAR Z THREAD 11 X 100 - BLADED (6/BX)

## (undated) DEVICE — SET LEADWIRE 5 LEAD BEDSIDE DISPOSABLE ECG (1SET OF 5/EA)

## (undated) DEVICE — SUTURE 0 VICRYL PLUS UR-6 - 27 INCH (36/BX)

## (undated) DEVICE — SCISSORS 5MM CVD (6EA/BX)

## (undated) DEVICE — DRAIN J-VAC 10MM FLAT - (10/CA)

## (undated) DEVICE — GLOVE BIOGEL ECLIPSE  PF LATEX SIZE 6.5 (50PR/BX)

## (undated) DEVICE — GLOVE BIOGEL INDICATOR SZ 8.5 SURGICAL PF LTX - (50/BX 4BX/CA)

## (undated) DEVICE — GLOVE BIOGEL SZ 7.5 SURGICAL PF LTX - (50PR/BX 4BX/CA)

## (undated) DEVICE — SODIUM CHL IRRIGATION 0.9% 1000ML (12EA/CA)

## (undated) DEVICE — NEEDLE INSFL 120MM 14GA VRRS - (20/BX)

## (undated) DEVICE — DRAPESURG STERI-DRAPE LONG - (10/BX 4BX/CA)

## (undated) DEVICE — SYRINGE 30 ML LL (56/BX)

## (undated) DEVICE — ELECTRODE DUAL RETURN W/ CORD - (50/PK)

## (undated) DEVICE — ENDO PEANUT 5MM DEVICE (12EA/BX)

## (undated) DEVICE — DRESSING TRANSPARENT FILM TEGADERM 2.375 X 2.75"  (100EA/BX)"

## (undated) DEVICE — SUCTION INSTRUMENT YANKAUER BULBOUS TIP W/O VENT (50EA/CA)

## (undated) DEVICE — TROCAR 5X100 BLADED Z-THREAD - KII (6/BX)

## (undated) DEVICE — BAG RETRIEVAL 10ML (10EA/BX)

## (undated) DEVICE — SUTURE 2-0 ETHILON FS - (36/BX) 18 INCH

## (undated) DEVICE — CHLORAPREP 26 ML APPLICATOR - ORANGE TINT(25/CA)

## (undated) DEVICE — ELECTRODE 850 FOAM ADHESIVE - HYDROGEL RADIOTRNSPRNT (50/PK)

## (undated) DEVICE — CANNULA W/SEAL11X100ZTHREAD - (12/BX)

## (undated) DEVICE — TUBE CONNECT SUCTION CLEAR 120 X 1/4" (50EA/CA)"

## (undated) DEVICE — CANISTER SUCTION 3000ML MECHANICAL FILTER AUTO SHUTOFF MEDI-VAC NONSTERILE LF DISP  (40EA/CA)

## (undated) DEVICE — SUTURE GENERAL

## (undated) DEVICE — SPONGE GAUZESTER. 2X2 4-PL - (2/PK 50PK/BX 30BX/CS)

## (undated) DEVICE — RESERVOIR SUCTION 100 CC - SILICONE (20EA/CA)

## (undated) DEVICE — SET SUCTION/IRRIGATION WITH DISPOSABLE TIP (6/CA )PART #0250-070-520 IS A SUB

## (undated) DEVICE — NEPTUNE 4 PORT MANIFOLD - (20/PK)

## (undated) DEVICE — TUBE E-T HI-LO CUFF 7.5MM (10EA/PK)

## (undated) DEVICE — SUTURE 0 COATED VICRYL 6-18IN - (12PK/BX)

## (undated) DEVICE — SUTURE 4-0 VICRYL PLUS FS-2 - 27 INCH (36/BX)

## (undated) DEVICE — SENSOR SPO2 NEO LNCS ADHESIVE (20/BX) SEE USER NOTES

## (undated) DEVICE — MASK ANESTHESIA ADULT  - (100/CA)

## (undated) DEVICE — GLOVE BIOGEL PI INDICATOR SZ 7.0 SURGICAL PF LF - (50/BX 4BX/CA)

## (undated) DEVICE — KIT ROOM DECONTAMINATION

## (undated) DEVICE — HEAD HOLDER JUNIOR/ADULT

## (undated) DEVICE — CLIP MED LG INTNL HRZN TI ESCP - (20/BX)

## (undated) DEVICE — KIT ANESTHESIA W/CIRCUIT & 3/LT BAG W/FILTER (20EA/CA)

## (undated) DEVICE — LACTATED RINGERS INJ 1000 ML - (14EA/CA 60CA/PF)

## (undated) DEVICE — DETERGENT RENUZYME PLUS 10 OZ PACKET (50/BX)

## (undated) DEVICE — PROTECTOR ULNA NERVE - (36PR/CA)

## (undated) DEVICE — GLOVE BIOGEL SZ 8 SURGICAL PF LTX - (50PR/BX 4BX/CA)

## (undated) DEVICE — CANNULA W/SEAL 5X100 Z-THRE - ADED KII (12/BX)

## (undated) DEVICE — TUBING INSUFFLATION - (10/BX)

## (undated) DEVICE — TUBING CLEARLINK DUO-VENT - C-FLO (48EA/CA)